# Patient Record
Sex: FEMALE | URBAN - METROPOLITAN AREA
[De-identification: names, ages, dates, MRNs, and addresses within clinical notes are randomized per-mention and may not be internally consistent; named-entity substitution may affect disease eponyms.]

---

## 2018-02-15 ENCOUNTER — APPOINTMENT (RX ONLY)
Dept: URBAN - METROPOLITAN AREA CLINIC 53 | Facility: CLINIC | Age: 43
Setting detail: DERMATOLOGY
End: 2018-02-15

## 2018-02-15 DIAGNOSIS — L57.0 ACTINIC KERATOSIS: ICD-10-CM

## 2018-02-15 DIAGNOSIS — L82.1 OTHER SEBORRHEIC KERATOSIS: ICD-10-CM

## 2018-02-15 DIAGNOSIS — Z41.9 ENCOUNTER FOR PROCEDURE FOR PURPOSES OTHER THAN REMEDYING HEALTH STATE, UNSPECIFIED: ICD-10-CM

## 2018-02-15 PROCEDURE — 17003 DESTRUCT PREMALG LES 2-14: CPT

## 2018-02-15 PROCEDURE — 17000 DESTRUCT PREMALG LESION: CPT

## 2018-02-15 PROCEDURE — ? BOTOX (U OR CC)

## 2018-02-15 PROCEDURE — ? COUNSELING

## 2018-02-15 PROCEDURE — ? LIQUID NITROGEN

## 2018-02-15 PROCEDURE — 99212 OFFICE O/P EST SF 10 MIN: CPT | Mod: 25

## 2018-02-15 ASSESSMENT — LOCATION DETAILED DESCRIPTION DERM
LOCATION DETAILED: LEFT MEDIAL FRONTAL SCALP
LOCATION DETAILED: LEFT SUPERIOR MEDIAL FOREHEAD
LOCATION DETAILED: RIGHT SUPERIOR LATERAL FOREHEAD
LOCATION DETAILED: SUPERIOR MID FOREHEAD
LOCATION DETAILED: RIGHT SUPERIOR FOREHEAD
LOCATION DETAILED: LEFT SUPERIOR FOREHEAD
LOCATION DETAILED: RIGHT CENTRAL FRONTAL SCALP

## 2018-02-15 ASSESSMENT — LOCATION SIMPLE DESCRIPTION DERM
LOCATION SIMPLE: LEFT FOREHEAD
LOCATION SIMPLE: SUPERIOR FOREHEAD
LOCATION SIMPLE: RIGHT FOREHEAD
LOCATION SIMPLE: RIGHT SCALP
LOCATION SIMPLE: LEFT SCALP

## 2018-02-15 ASSESSMENT — LOCATION ZONE DERM
LOCATION ZONE: FACE
LOCATION ZONE: SCALP

## 2018-02-15 NOTE — PROCEDURE: BOTOX (U OR CC)
Consent: Written consent obtained. Risks include but not limited to lid/brow ptosis, bruising, swelling, diplopia, temporary effect, incomplete chemical denervation.
Detail Level: Detailed
Price Per Unit Or Per Cc In $ (Use Numbers Only, No Special Characters Or $): 12.00
Post-Care Instructions: Instructions after Botox:\\nDo not lay flat for 4 hours after procedure. Limit physical activity for 24 hours. No heavy lifting (15lbs or greater) or bending (putting head below level of heart) for the remainder of the day. Do not apply pressure or compression to areas treated for 24 hours. Avoid facials or saunas for 24 hours after your treatment. Do not apply heat for 24 hours. Avoid traveling by airplane for 48 hours. Try to avoid make-up and skincare products for the remainder of the day to minimize risk for infection. If you need to apply make-up within 4 hours after your treatment, only use a GENTLE touch to avoid rubbing the treated area.\\n\\nFeel free to shower and go about most other regular daily activities. Be assured that any tiny bumps or marks will go away within a few hours.  Results may take up to 14 days to take full effect. Please wait until the 14 days have passed before assessing if you are pleased with the result. \\n\\nBotox is a temporary procedure. You may find that your treatment results will last approximately 3-4 months. If you maintain your treatment appointments with routine frequency, the duration of each treatment may last longer than 4 months. With routine follow-up treatments, we are able to create the best clinical results for you.
Dilution (U/0.1 Cc): 1.1
Measure In Units Or Cc's?: units
Quantity Per Injection Site (Units): 2

## 2018-02-15 NOTE — HPI: COSMETIC (BOTOX)
Have You Had Botox Before?: has had botox
When Was Your Last Botox Treatment?: 10/13/2017
Additional History: Patient was very happy with last treatment and the results.

## 2018-02-15 NOTE — PROCEDURE: LIQUID NITROGEN
Duration Of Freeze Thaw-Cycle (Seconds): 3
Post-Care Instructions: I reviewed with the patient in detail post-care instructions. Patient is to wear sunprotection, and avoid picking at any of the treated lesions. Pt may apply Vaseline to crusted or scabbing areas.
Render Post-Care Instructions In Note?: yes
Consent: The patient's consent was obtained including but not limited to risks of crusting, scabbing, blistering, scarring, darker or lighter pigmentary change, recurrence, incomplete removal and infection.
Number Of Freeze-Thaw Cycles: 1 freeze-thaw cycle
Detail Level: Detailed

## 2018-02-15 NOTE — HPI: SKIN LESION
How Severe Is Your Skin Lesion?: mild
Has Your Skin Lesion Been Treated?: not been treated
Is This A New Presentation, Or A Follow-Up?: Skin Lesion
Is This A New Presentation, Or A Follow-Up?: Mole

## 2018-05-04 ENCOUNTER — APPOINTMENT (RX ONLY)
Dept: URBAN - METROPOLITAN AREA CLINIC 50 | Facility: CLINIC | Age: 43
Setting detail: DERMATOLOGY
End: 2018-05-04

## 2018-05-04 DIAGNOSIS — D18.0 HEMANGIOMA: ICD-10-CM

## 2018-05-04 DIAGNOSIS — L82.1 OTHER SEBORRHEIC KERATOSIS: ICD-10-CM

## 2018-05-04 DIAGNOSIS — L81.4 OTHER MELANIN HYPERPIGMENTATION: ICD-10-CM

## 2018-05-04 DIAGNOSIS — L57.0 ACTINIC KERATOSIS: ICD-10-CM

## 2018-05-04 DIAGNOSIS — L82.0 INFLAMED SEBORRHEIC KERATOSIS: ICD-10-CM

## 2018-05-04 DIAGNOSIS — D22 MELANOCYTIC NEVI: ICD-10-CM

## 2018-05-04 PROBLEM — D48.5 NEOPLASM OF UNCERTAIN BEHAVIOR OF SKIN: Status: ACTIVE | Noted: 2018-05-04

## 2018-05-04 PROBLEM — D22.5 MELANOCYTIC NEVI OF TRUNK: Status: ACTIVE | Noted: 2018-05-04

## 2018-05-04 PROBLEM — D18.01 HEMANGIOMA OF SKIN AND SUBCUTANEOUS TISSUE: Status: ACTIVE | Noted: 2018-05-04

## 2018-05-04 PROCEDURE — 11100: CPT | Mod: 59

## 2018-05-04 PROCEDURE — 17000 DESTRUCT PREMALG LESION: CPT

## 2018-05-04 PROCEDURE — ? COUNSELING

## 2018-05-04 PROCEDURE — 99213 OFFICE O/P EST LOW 20 MIN: CPT | Mod: 25

## 2018-05-04 PROCEDURE — ? LIQUID NITROGEN

## 2018-05-04 PROCEDURE — ? BIOPSY BY SHAVE METHOD

## 2018-05-04 PROCEDURE — 17003 DESTRUCT PREMALG LES 2-14: CPT

## 2018-05-04 ASSESSMENT — LOCATION DETAILED DESCRIPTION DERM
LOCATION DETAILED: RIGHT ANTERIOR DISTAL THIGH
LOCATION DETAILED: RIGHT SUPERIOR UPPER BACK
LOCATION DETAILED: RIGHT INFERIOR CENTRAL MALAR CHEEK
LOCATION DETAILED: LEFT LATERAL ABDOMEN
LOCATION DETAILED: RIGHT INFERIOR LATERAL FOREHEAD
LOCATION DETAILED: LEFT POSTERIOR SHOULDER
LOCATION DETAILED: LEFT RIB CAGE
LOCATION DETAILED: LEFT LATERAL SUPERIOR CHEST
LOCATION DETAILED: RIGHT PROXIMAL RADIAL DORSAL FOREARM

## 2018-05-04 ASSESSMENT — LOCATION SIMPLE DESCRIPTION DERM
LOCATION SIMPLE: RIGHT FOREARM
LOCATION SIMPLE: RIGHT CHEEK
LOCATION SIMPLE: CHEST
LOCATION SIMPLE: RIGHT UPPER BACK
LOCATION SIMPLE: RIGHT THIGH
LOCATION SIMPLE: RIGHT FOREHEAD
LOCATION SIMPLE: LEFT SHOULDER
LOCATION SIMPLE: ABDOMEN

## 2018-05-04 ASSESSMENT — LOCATION ZONE DERM
LOCATION ZONE: LEG
LOCATION ZONE: ARM
LOCATION ZONE: FACE
LOCATION ZONE: TRUNK

## 2018-05-04 NOTE — PROCEDURE: LIQUID NITROGEN
Consent: The patient's consent was obtained including but not limited to risks of crusting, scabbing, blistering, scarring, darker or lighter pigmentary change, recurrence, incomplete removal and infection.
Post-Care Instructions: I reviewed with the patient in detail post-care instructions. Patient is to wear sunprotection, and avoid picking at any of the treated lesions. Pt may apply Vaseline to crusted or scabbing areas.
Detail Level: Detailed
Duration Of Freeze Thaw-Cycle (Seconds): 3
Number Of Freeze-Thaw Cycles: 1 freeze-thaw cycle
Render Post-Care Instructions In Note?: yes

## 2018-10-05 ENCOUNTER — APPOINTMENT (RX ONLY)
Dept: URBAN - METROPOLITAN AREA CLINIC 74 | Facility: CLINIC | Age: 43
Setting detail: DERMATOLOGY
End: 2018-10-05

## 2018-10-05 DIAGNOSIS — L57.0 ACTINIC KERATOSIS: ICD-10-CM

## 2018-10-05 PROCEDURE — ? COUNSELING

## 2018-10-05 PROCEDURE — ? LIQUID NITROGEN

## 2018-10-05 PROCEDURE — 17000 DESTRUCT PREMALG LESION: CPT

## 2018-10-05 PROCEDURE — 17003 DESTRUCT PREMALG LES 2-14: CPT

## 2018-10-05 ASSESSMENT — LOCATION SIMPLE DESCRIPTION DERM
LOCATION SIMPLE: CHEST
LOCATION SIMPLE: LEFT PRETIBIAL REGION
LOCATION SIMPLE: LEFT POSTERIOR UPPER ARM

## 2018-10-05 ASSESSMENT — LOCATION DETAILED DESCRIPTION DERM
LOCATION DETAILED: LEFT PROXIMAL POSTERIOR UPPER ARM
LOCATION DETAILED: LEFT LATERAL SUPERIOR CHEST
LOCATION DETAILED: LEFT MEDIAL PROXIMAL PRETIBIAL REGION
LOCATION DETAILED: RIGHT MEDIAL SUPERIOR CHEST

## 2018-10-05 ASSESSMENT — LOCATION ZONE DERM
LOCATION ZONE: ARM
LOCATION ZONE: LEG
LOCATION ZONE: TRUNK

## 2018-10-05 NOTE — PROCEDURE: LIQUID NITROGEN
Number Of Freeze-Thaw Cycles: 1 freeze-thaw cycle
Detail Level: Detailed
Render Post-Care Instructions In Note?: no
Duration Of Freeze Thaw-Cycle (Seconds): 20
Post-Care Instructions: I reviewed with the patient in detail post-care instructions. Patient is to wear sunprotection, and avoid picking at any of the treated lesions. Pt may apply Vaseline or Aquaphor to crusted or scabbing areas.
Consent: The patient's consent was obtained including but not limited to risks of crusting, scabbing, blistering, scarring, darker or lighter pigmentary change, recurrence, incomplete removal and infection.

## 2018-10-25 ENCOUNTER — APPOINTMENT (RX ONLY)
Dept: URBAN - METROPOLITAN AREA CLINIC 53 | Facility: CLINIC | Age: 43
Setting detail: DERMATOLOGY
End: 2018-10-25

## 2018-10-25 DIAGNOSIS — Z41.9 ENCOUNTER FOR PROCEDURE FOR PURPOSES OTHER THAN REMEDYING HEALTH STATE, UNSPECIFIED: ICD-10-CM

## 2018-10-25 PROCEDURE — ? BOTOX (U OR CC)

## 2018-10-25 ASSESSMENT — LOCATION SIMPLE DESCRIPTION DERM
LOCATION SIMPLE: SUPERIOR FOREHEAD
LOCATION SIMPLE: RIGHT FOREHEAD
LOCATION SIMPLE: LEFT FOREHEAD

## 2018-10-25 ASSESSMENT — LOCATION DETAILED DESCRIPTION DERM
LOCATION DETAILED: RIGHT SUPERIOR FOREHEAD
LOCATION DETAILED: RIGHT SUPERIOR LATERAL FOREHEAD
LOCATION DETAILED: LEFT SUPERIOR MEDIAL FOREHEAD
LOCATION DETAILED: SUPERIOR MID FOREHEAD
LOCATION DETAILED: LEFT SUPERIOR FOREHEAD

## 2018-10-25 ASSESSMENT — LOCATION ZONE DERM: LOCATION ZONE: FACE

## 2018-10-25 NOTE — PROCEDURE: BOTOX (U OR CC)
Price Per Unit Or Per Cc In $ (Use Numbers Only, No Special Characters Or $): 12.00
Detail Level: Detailed
Consent: Written consent obtained. Risks include but not limited to lid/brow ptosis, bruising, swelling, diplopia, temporary effect, incomplete chemical denervation.
Dilution (U/0.1 Cc): 1.1
Post-Care Instructions: Instructions after Botox:\\nDo not lay flat for 4 hours after procedure. Limit physical activity for 24 hours. No heavy lifting (15lbs or greater) or bending (putting head below level of heart) for the remainder of the day. Do not apply pressure or compression to areas treated for 24 hours. Avoid facials or saunas for 24 hours after your treatment. Do not apply heat for 24 hours. Avoid traveling by airplane for 48 hours. Try to avoid make-up and skincare products for the remainder of the day to minimize risk for infection. If you need to apply make-up within 4 hours after your treatment, only use a GENTLE touch to avoid rubbing the treated area.\\n\\nFeel free to shower and go about most other regular daily activities. Be assured that any tiny bumps or marks will go away within a few hours.  Results may take up to 14 days to take full effect. Please wait until the 14 days have passed before assessing if you are pleased with the result. \\n\\nBotox is a temporary procedure. You may find that your treatment results will last approximately 3-4 months. If you maintain your treatment appointments with routine frequency, the duration of each treatment may last longer than 4 months. With routine follow-up treatments, we are able to create the best clinical results for you.
Measure In Units Or Cc's?: units
Quantity Per Injection Site (Units): 2

## 2018-11-27 ENCOUNTER — APPOINTMENT (RX ONLY)
Dept: URBAN - METROPOLITAN AREA CLINIC 74 | Facility: CLINIC | Age: 43
Setting detail: DERMATOLOGY
End: 2018-11-27

## 2018-11-27 DIAGNOSIS — L81.4 OTHER MELANIN HYPERPIGMENTATION: ICD-10-CM

## 2018-11-27 DIAGNOSIS — Z41.9 ENCOUNTER FOR PROCEDURE FOR PURPOSES OTHER THAN REMEDYING HEALTH STATE, UNSPECIFIED: ICD-10-CM

## 2018-11-27 DIAGNOSIS — L57.0 ACTINIC KERATOSIS: ICD-10-CM

## 2018-11-27 PROCEDURE — ? LIQUID NITROGEN

## 2018-11-27 PROCEDURE — ? BOTOX (U OR CC)

## 2018-11-27 PROCEDURE — ? COUNSELING

## 2018-11-27 PROCEDURE — 17000 DESTRUCT PREMALG LESION: CPT

## 2018-11-27 PROCEDURE — 99212 OFFICE O/P EST SF 10 MIN: CPT | Mod: 25

## 2018-11-27 ASSESSMENT — LOCATION SIMPLE DESCRIPTION DERM
LOCATION SIMPLE: LEFT FOREARM
LOCATION SIMPLE: RIGHT FOREARM
LOCATION SIMPLE: LEFT FOREHEAD
LOCATION SIMPLE: LEFT SHOULDER
LOCATION SIMPLE: RIGHT FOREHEAD
LOCATION SIMPLE: GLABELLA

## 2018-11-27 ASSESSMENT — LOCATION DETAILED DESCRIPTION DERM
LOCATION DETAILED: RIGHT PROXIMAL DORSAL FOREARM
LOCATION DETAILED: LEFT POSTERIOR SHOULDER
LOCATION DETAILED: RIGHT DISTAL DORSAL FOREARM
LOCATION DETAILED: LEFT INFERIOR MEDIAL FOREHEAD
LOCATION DETAILED: RIGHT INFERIOR MEDIAL FOREHEAD
LOCATION DETAILED: GLABELLA
LOCATION DETAILED: RIGHT INFERIOR FOREHEAD
LOCATION DETAILED: LEFT INFERIOR FOREHEAD
LOCATION DETAILED: LEFT PROXIMAL DORSAL FOREARM

## 2018-11-27 ASSESSMENT — LOCATION ZONE DERM
LOCATION ZONE: FACE
LOCATION ZONE: ARM

## 2018-11-27 NOTE — PROCEDURE: LIQUID NITROGEN
Render Post-Care Instructions In Note?: no
Consent: The patient's consent was obtained including but not limited to risks of crusting, scabbing, blistering, scarring, darker or lighter pigmentary change, recurrence, incomplete removal and infection.
Post-Care Instructions: I reviewed with the patient in detail post-care instructions. Patient is to wear sunprotection, and avoid picking at any of the treated lesions. Pt may apply Vaseline or Aquaphor to crusted or scabbing areas.
Number Of Freeze-Thaw Cycles: 1 freeze-thaw cycle
Duration Of Freeze Thaw-Cycle (Seconds): 20
Detail Level: Detailed

## 2019-02-05 ENCOUNTER — APPOINTMENT (RX ONLY)
Dept: URBAN - METROPOLITAN AREA CLINIC 74 | Facility: CLINIC | Age: 44
Setting detail: DERMATOLOGY
End: 2019-02-05

## 2019-02-05 DIAGNOSIS — I78.8 OTHER DISEASES OF CAPILLARIES: ICD-10-CM

## 2019-02-05 DIAGNOSIS — L57.0 ACTINIC KERATOSIS: ICD-10-CM

## 2019-02-05 DIAGNOSIS — L81.4 OTHER MELANIN HYPERPIGMENTATION: ICD-10-CM

## 2019-02-05 PROCEDURE — ? PRODUCT LINE (OBAGI)

## 2019-02-05 PROCEDURE — ? LIQUID NITROGEN

## 2019-02-05 PROCEDURE — ? COUNSELING

## 2019-02-05 PROCEDURE — 17000 DESTRUCT PREMALG LESION: CPT

## 2019-02-05 PROCEDURE — 99213 OFFICE O/P EST LOW 20 MIN: CPT | Mod: 25

## 2019-02-05 ASSESSMENT — LOCATION DETAILED DESCRIPTION DERM
LOCATION DETAILED: LEFT INFERIOR CENTRAL MALAR CHEEK
LOCATION DETAILED: LEFT CENTRAL MALAR CHEEK
LOCATION DETAILED: SUPERIOR MID FOREHEAD

## 2019-02-05 ASSESSMENT — LOCATION SIMPLE DESCRIPTION DERM
LOCATION SIMPLE: SUPERIOR FOREHEAD
LOCATION SIMPLE: LEFT CHEEK

## 2019-02-05 ASSESSMENT — LOCATION ZONE DERM: LOCATION ZONE: FACE

## 2019-02-05 NOTE — PROCEDURE: PRODUCT LINE (OBAGI)
Product 4 Application Directions: Apply every other night, increase as tolerated
Product 21 Units: 0
Product 7 Application Directions: Apply daily
Product 10 Application Directions: Apply daily around eyes
Product 1 Application Directions: Apply every other night, increase to nightly as tolerated
Product 32 Price (In Dollars - Numeric Only, No Special Characters Or $): 0.00
Name Of Product 8: Obagi nu derm trial kit normal to dry
Name Of Product 11: Obagi matte SPF 50
Name Of Product 5: Obagi Vitamin C kit
Name Of Product 2: Obagi clarifying serum
Product 2 Price (In Dollars - Numeric Only, No Special Characters Or $): 123
Product 5 Price (In Dollars - Numeric Only, No Special Characters Or $): 299.20
Product 11 Price (In Dollars - Numeric Only, No Special Characters Or $): 59.23
Product 8 Price (In Dollars - Numeric Only, No Special Characters Or $): 183.44
Product 2 Units: 1
Product 2 Application Directions: Apply every am for pigmentation.
Product 5 Application Directions: Follow steps as instructed
Product 8 Application Directions: Apply morning and night as directed
Name Of Product 9: Obagi - C cleansing gel
Name Of Product 6: Obagi warm tinted sunscreen 50 SPF
Name Of Product 3: Obagi 
Detail Level: Zone
Product 9 Price (In Dollars - Numeric Only, No Special Characters Or $): 48.30
Product 3 Price (In Dollars - Numeric Only, No Special Characters Or $): 103
Allow Plan To Count Towards E/M Coding: Yes
Product 3 Application Directions: Apply nightly blended with Tretinoin.
Send Charges To Patient Encounter: No
Name Of Product 4: Tretinoin .05% cream
Name Of Product 10: Elastiderm eye serum
Name Of Product 7: Obagi elastiderm eye cream
Name Of Product 1: Obagi Tretinoin .1%
Product 7 Price (In Dollars - Numeric Only, No Special Characters Or $): 128
Product 10 Price (In Dollars - Numeric Only, No Special Characters Or $): 128.80
Product 1 Price (In Dollars - Numeric Only, No Special Characters Or $): 45

## 2019-02-05 NOTE — PROCEDURE: LIQUID NITROGEN
Detail Level: Detailed
Duration Of Freeze Thaw-Cycle (Seconds): 20
Render Post-Care Instructions In Note?: no
Number Of Freeze-Thaw Cycles: 2 freeze-thaw cycles
Post-Care Instructions: I reviewed with the patient in detail post-care instructions. Patient is to wear sunprotection, and avoid picking at any of the treated lesions. Pt may apply Vaseline or Aquaphor to crusted or scabbing areas.
Consent: The patient's consent was obtained including but not limited to risks of crusting, scabbing, blistering, scarring, darker or lighter pigmentary change, recurrence, incomplete removal and infection.

## 2019-04-04 ENCOUNTER — APPOINTMENT (RX ONLY)
Dept: URBAN - METROPOLITAN AREA CLINIC 74 | Facility: CLINIC | Age: 44
Setting detail: DERMATOLOGY
End: 2019-04-04

## 2019-04-04 DIAGNOSIS — Z41.9 ENCOUNTER FOR PROCEDURE FOR PURPOSES OTHER THAN REMEDYING HEALTH STATE, UNSPECIFIED: ICD-10-CM

## 2019-04-04 PROCEDURE — ? DIAGNOSIS COMMENT

## 2019-04-04 PROCEDURE — ? BOTOX (U OR CC)

## 2019-04-04 ASSESSMENT — LOCATION SIMPLE DESCRIPTION DERM
LOCATION SIMPLE: RIGHT FOREHEAD
LOCATION SIMPLE: SUPERIOR FOREHEAD
LOCATION SIMPLE: RIGHT SCALP
LOCATION SIMPLE: LEFT FOREHEAD

## 2019-04-04 ASSESSMENT — LOCATION DETAILED DESCRIPTION DERM
LOCATION DETAILED: RIGHT CENTRAL FRONTAL SCALP
LOCATION DETAILED: RIGHT FOREHEAD
LOCATION DETAILED: LEFT SUPERIOR FOREHEAD
LOCATION DETAILED: LEFT LATERAL FOREHEAD
LOCATION DETAILED: RIGHT LATERAL FOREHEAD
LOCATION DETAILED: LEFT FOREHEAD
LOCATION DETAILED: SUPERIOR MID FOREHEAD

## 2019-04-04 ASSESSMENT — LOCATION ZONE DERM
LOCATION ZONE: SCALP
LOCATION ZONE: FACE

## 2019-04-04 NOTE — PROCEDURE: BOTOX (U OR CC)
Encounter addended by: Rachel Richey, PT on: 5/22/2018  9:29 AM<BR>     Actions taken: Flowsheet accepted Price Per Unit Or Per Cc In $ (Use Numbers Only, No Special Characters Or $): 12

## 2019-08-30 ENCOUNTER — APPOINTMENT (RX ONLY)
Dept: URBAN - METROPOLITAN AREA CLINIC 74 | Facility: CLINIC | Age: 44
Setting detail: DERMATOLOGY
End: 2019-08-30

## 2019-08-30 DIAGNOSIS — Z41.9 ENCOUNTER FOR PROCEDURE FOR PURPOSES OTHER THAN REMEDYING HEALTH STATE, UNSPECIFIED: ICD-10-CM

## 2019-08-30 PROCEDURE — ? DIAGNOSIS COMMENT

## 2019-08-30 PROCEDURE — ? OTHER

## 2019-08-30 PROCEDURE — ? BOTOX (U OR CC)

## 2019-08-30 ASSESSMENT — LOCATION DETAILED DESCRIPTION DERM
LOCATION DETAILED: RIGHT FOREHEAD
LOCATION DETAILED: LEFT FOREHEAD
LOCATION DETAILED: RIGHT CENTRAL FRONTAL SCALP
LOCATION DETAILED: SUPERIOR MID FOREHEAD
LOCATION DETAILED: LEFT LATERAL FOREHEAD
LOCATION DETAILED: RIGHT LATERAL FOREHEAD
LOCATION DETAILED: LEFT SUPERIOR FOREHEAD

## 2019-08-30 ASSESSMENT — LOCATION SIMPLE DESCRIPTION DERM
LOCATION SIMPLE: RIGHT FOREHEAD
LOCATION SIMPLE: RIGHT SCALP
LOCATION SIMPLE: SUPERIOR FOREHEAD
LOCATION SIMPLE: LEFT FOREHEAD

## 2019-08-30 ASSESSMENT — LOCATION ZONE DERM
LOCATION ZONE: SCALP
LOCATION ZONE: FACE

## 2019-08-30 NOTE — PROCEDURE: OTHER
Detail Level: Zone
Other (Free Text): Patient purchased R essentials hydrating serum
Note Text (......Xxx Chief Complaint.): This diagnosis correlates with the

## 2020-01-14 ENCOUNTER — APPOINTMENT (RX ONLY)
Dept: URBAN - METROPOLITAN AREA CLINIC 74 | Facility: CLINIC | Age: 45
Setting detail: DERMATOLOGY
End: 2020-01-14

## 2020-01-14 DIAGNOSIS — L57.0 ACTINIC KERATOSIS: ICD-10-CM

## 2020-01-14 DIAGNOSIS — Z41.9 ENCOUNTER FOR PROCEDURE FOR PURPOSES OTHER THAN REMEDYING HEALTH STATE, UNSPECIFIED: ICD-10-CM

## 2020-01-14 PROCEDURE — 17000 DESTRUCT PREMALG LESION: CPT

## 2020-01-14 PROCEDURE — ? DIAGNOSIS COMMENT

## 2020-01-14 PROCEDURE — ? BOTOX (U OR CC)

## 2020-01-14 PROCEDURE — ? LIQUID NITROGEN

## 2020-01-14 PROCEDURE — ? FULL BODY SKIN EXAM - DECLINED

## 2020-01-14 ASSESSMENT — LOCATION DETAILED DESCRIPTION DERM
LOCATION DETAILED: LEFT SUPERIOR CENTRAL BUCCAL CHEEK
LOCATION DETAILED: RIGHT INFERIOR MEDIAL FOREHEAD
LOCATION DETAILED: RIGHT LATERAL FOREHEAD
LOCATION DETAILED: SUPERIOR MID FOREHEAD
LOCATION DETAILED: GLABELLA
LOCATION DETAILED: LEFT LATERAL FOREHEAD
LOCATION DETAILED: RIGHT FOREHEAD
LOCATION DETAILED: LEFT INFERIOR MEDIAL FOREHEAD
LOCATION DETAILED: LEFT FOREHEAD

## 2020-01-14 ASSESSMENT — LOCATION SIMPLE DESCRIPTION DERM
LOCATION SIMPLE: LEFT FOREHEAD
LOCATION SIMPLE: SUPERIOR FOREHEAD
LOCATION SIMPLE: LEFT CHEEK
LOCATION SIMPLE: RIGHT FOREHEAD
LOCATION SIMPLE: GLABELLA

## 2020-01-14 ASSESSMENT — LOCATION ZONE DERM: LOCATION ZONE: FACE

## 2020-01-14 NOTE — PROCEDURE: LIQUID NITROGEN
Number Of Freeze-Thaw Cycles: 2 freeze-thaw cycles
Post-Care Instructions: I reviewed with the patient in detail post-care instructions. Patient is to wear sunprotection, and avoid picking at any of the treated lesions. Pt may apply Vaseline or Aquaphor to crusted or scabbing areas.
Duration Of Freeze Thaw-Cycle (Seconds): 10
Render Note In Bullet Format When Appropriate: No
Consent: The patient's consent was obtained including but not limited to risks of crusting, scabbing, blistering, scarring, darker or lighter pigmentary change, recurrence, incomplete removal and infection.
Detail Level: Detailed

## 2020-01-23 ENCOUNTER — APPOINTMENT (RX ONLY)
Dept: URBAN - METROPOLITAN AREA CLINIC 74 | Facility: CLINIC | Age: 45
Setting detail: DERMATOLOGY
End: 2020-01-23

## 2020-01-23 DIAGNOSIS — Z41.9 ENCOUNTER FOR PROCEDURE FOR PURPOSES OTHER THAN REMEDYING HEALTH STATE, UNSPECIFIED: ICD-10-CM

## 2020-01-23 PROCEDURE — ? BOTOX (U OR CC)

## 2020-01-23 ASSESSMENT — LOCATION DETAILED DESCRIPTION DERM
LOCATION DETAILED: LEFT CENTRAL FRONTAL SCALP
LOCATION DETAILED: LEFT MEDIAL FRONTAL SCALP
LOCATION DETAILED: RIGHT CENTRAL FRONTAL SCALP

## 2020-01-23 ASSESSMENT — LOCATION SIMPLE DESCRIPTION DERM
LOCATION SIMPLE: LEFT SCALP
LOCATION SIMPLE: RIGHT SCALP

## 2020-01-23 ASSESSMENT — LOCATION ZONE DERM: LOCATION ZONE: SCALP

## 2020-05-08 ENCOUNTER — APPOINTMENT (RX ONLY)
Dept: URBAN - METROPOLITAN AREA CLINIC 74 | Facility: CLINIC | Age: 45
Setting detail: DERMATOLOGY
End: 2020-05-08

## 2020-05-08 DIAGNOSIS — L27.1 LOCALIZED SKIN ERUPTION DUE TO DRUGS AND MEDICAMENTS TAKEN INTERNALLY: ICD-10-CM

## 2020-05-08 PROCEDURE — 99213 OFFICE O/P EST LOW 20 MIN: CPT

## 2020-05-08 PROCEDURE — ? COUNSELING

## 2020-05-08 PROCEDURE — ? ADDITIONAL NOTES

## 2020-05-08 PROCEDURE — ? TREATMENT REGIMEN

## 2020-05-08 ASSESSMENT — LOCATION SIMPLE DESCRIPTION DERM: LOCATION SIMPLE: RIGHT CHEEK

## 2020-05-08 ASSESSMENT — LOCATION ZONE DERM: LOCATION ZONE: FACE

## 2020-05-08 ASSESSMENT — LOCATION DETAILED DESCRIPTION DERM
LOCATION DETAILED: RIGHT INFERIOR CENTRAL MALAR CHEEK
LOCATION DETAILED: RIGHT CENTRAL BUCCAL CHEEK

## 2020-05-08 NOTE — PROCEDURE: TREATMENT REGIMEN
Initiate Treatment: Triamcinolone.1% cream apply sparingly to affected areas of face twice daily, taper as improves \\nHypoallergenic cleanser and moisturizer
Detail Level: Zone
Discontinue Regimen: Retinoid until healed. Once restarted, start very slowly and gradually increase

## 2020-06-02 ENCOUNTER — APPOINTMENT (RX ONLY)
Dept: URBAN - METROPOLITAN AREA CLINIC 74 | Facility: CLINIC | Age: 45
Setting detail: DERMATOLOGY
End: 2020-06-02

## 2020-06-02 DIAGNOSIS — L01.01 NON-BULLOUS IMPETIGO: ICD-10-CM

## 2020-06-02 PROBLEM — L08.9 LOCAL INFECTION OF THE SKIN AND SUBCUTANEOUS TISSUE, UNSPECIFIED: Status: ACTIVE | Noted: 2020-06-02

## 2020-06-02 PROCEDURE — ? PRESCRIPTION

## 2020-06-02 PROCEDURE — ? FULL BODY SKIN EXAM - DECLINED

## 2020-06-02 PROCEDURE — ? COUNSELING

## 2020-06-02 PROCEDURE — 99213 OFFICE O/P EST LOW 20 MIN: CPT

## 2020-06-02 RX ORDER — MUPIROCIN 20 MG/G
OINTMENT TOPICAL TID
Qty: 1 | Refills: 2 | Status: ERX | COMMUNITY
Start: 2020-06-02

## 2020-06-02 RX ADMIN — MUPIROCIN: 20 OINTMENT TOPICAL at 00:00

## 2020-06-02 ASSESSMENT — LOCATION ZONE DERM: LOCATION ZONE: FACE

## 2020-06-02 ASSESSMENT — LOCATION SIMPLE DESCRIPTION DERM: LOCATION SIMPLE: RIGHT CHEEK

## 2020-06-02 ASSESSMENT — LOCATION DETAILED DESCRIPTION DERM: LOCATION DETAILED: RIGHT CENTRAL BUCCAL CHEEK

## 2020-11-05 ENCOUNTER — APPOINTMENT (RX ONLY)
Dept: URBAN - METROPOLITAN AREA CLINIC 74 | Facility: CLINIC | Age: 45
Setting detail: DERMATOLOGY
End: 2020-11-05

## 2020-11-05 DIAGNOSIS — D22 MELANOCYTIC NEVI: ICD-10-CM

## 2020-11-05 DIAGNOSIS — L82.0 INFLAMED SEBORRHEIC KERATOSIS: ICD-10-CM

## 2020-11-05 DIAGNOSIS — L82.1 OTHER SEBORRHEIC KERATOSIS: ICD-10-CM

## 2020-11-05 DIAGNOSIS — L81.4 OTHER MELANIN HYPERPIGMENTATION: ICD-10-CM

## 2020-11-05 DIAGNOSIS — L91.8 OTHER HYPERTROPHIC DISORDERS OF THE SKIN: ICD-10-CM

## 2020-11-05 DIAGNOSIS — L57.0 ACTINIC KERATOSIS: ICD-10-CM

## 2020-11-05 PROBLEM — D22.5 MELANOCYTIC NEVI OF TRUNK: Status: ACTIVE | Noted: 2020-11-05

## 2020-11-05 PROBLEM — D48.5 NEOPLASM OF UNCERTAIN BEHAVIOR OF SKIN: Status: ACTIVE | Noted: 2020-11-05

## 2020-11-05 PROCEDURE — 17000 DESTRUCT PREMALG LESION: CPT | Mod: 59

## 2020-11-05 PROCEDURE — ? COUNSELING

## 2020-11-05 PROCEDURE — ? FULL BODY SKIN EXAM

## 2020-11-05 PROCEDURE — ? LIQUID NITROGEN

## 2020-11-05 PROCEDURE — 17003 DESTRUCT PREMALG LES 2-14: CPT

## 2020-11-05 PROCEDURE — 11102 TANGNTL BX SKIN SINGLE LES: CPT

## 2020-11-05 PROCEDURE — ? BIOPSY BY SHAVE METHOD

## 2020-11-05 PROCEDURE — 99214 OFFICE O/P EST MOD 30 MIN: CPT | Mod: 25

## 2020-11-05 PROCEDURE — 11103 TANGNTL BX SKIN EA SEP/ADDL: CPT

## 2020-11-05 ASSESSMENT — LOCATION ZONE DERM
LOCATION ZONE: ARM
LOCATION ZONE: TRUNK
LOCATION ZONE: LEG

## 2020-11-05 ASSESSMENT — LOCATION DETAILED DESCRIPTION DERM
LOCATION DETAILED: LEFT PROXIMAL PRETIBIAL REGION
LOCATION DETAILED: LEFT ANTERIOR PROXIMAL THIGH
LOCATION DETAILED: MIDDLE STERNUM
LOCATION DETAILED: LEFT MEDIAL BREAST 7-8:00 REGION
LOCATION DETAILED: RIGHT ANTERIOR DISTAL THIGH
LOCATION DETAILED: LEFT DISTAL DORSAL FOREARM
LOCATION DETAILED: INFERIOR THORACIC SPINE
LOCATION DETAILED: RIGHT PROXIMAL DORSAL FOREARM
LOCATION DETAILED: RIGHT RIB CAGE
LOCATION DETAILED: LEFT SUPERIOR LATERAL UPPER BACK
LOCATION DETAILED: RIGHT ANTERIOR PROXIMAL THIGH
LOCATION DETAILED: RIGHT DISTAL PRETIBIAL REGION
LOCATION DETAILED: LEFT SUPERIOR UPPER BACK
LOCATION DETAILED: LEFT PROXIMAL DORSAL FOREARM
LOCATION DETAILED: LEFT MEDIAL UPPER BACK
LOCATION DETAILED: LEFT ANTERIOR DISTAL THIGH

## 2020-11-05 ASSESSMENT — LOCATION SIMPLE DESCRIPTION DERM
LOCATION SIMPLE: CHEST
LOCATION SIMPLE: ABDOMEN
LOCATION SIMPLE: RIGHT THIGH
LOCATION SIMPLE: UPPER BACK
LOCATION SIMPLE: LEFT PRETIBIAL REGION
LOCATION SIMPLE: LEFT BREAST
LOCATION SIMPLE: LEFT FOREARM
LOCATION SIMPLE: LEFT UPPER BACK
LOCATION SIMPLE: RIGHT FOREARM
LOCATION SIMPLE: RIGHT PRETIBIAL REGION
LOCATION SIMPLE: LEFT THIGH

## 2020-11-05 NOTE — PROCEDURE: LIQUID NITROGEN
Duration Of Freeze Thaw-Cycle (Seconds): 10
Detail Level: Detailed
Number Of Freeze-Thaw Cycles: 2 freeze-thaw cycles
Post-Care Instructions: I reviewed with the patient in detail post-care instructions. Patient is to wear sunprotection, and avoid picking at any of the treated lesions. Pt may apply Vaseline or Aquaphor to crusted or scabbing areas.
Render Note In Bullet Format When Appropriate: No
Consent: The patient's consent was obtained including but not limited to risks of crusting, scabbing, blistering, scarring, darker or lighter pigmentary change, recurrence, incomplete removal and infection.

## 2020-11-05 NOTE — HPI: EVALUATION OF SKIN LESION(S)
What Type Of Note Output Would You Prefer (Optional)?: Bullet Format
Hpi Title: Evaluation of Skin Lesions
How Severe Are Your Spot(S)?: moderate
Have Your Spot(S) Been Treated In The Past?: has not been treated
Additional History: Pt c/o lesion on left thigh x 3 weeks that is “rough and raised”. Also c/o “rough and raised” lesion on left post shoulder x months

## 2021-02-23 ENCOUNTER — APPOINTMENT (RX ONLY)
Dept: URBAN - METROPOLITAN AREA CLINIC 74 | Facility: CLINIC | Age: 46
Setting detail: DERMATOLOGY
End: 2021-02-23

## 2021-02-23 DIAGNOSIS — L57.0 ACTINIC KERATOSIS: ICD-10-CM

## 2021-02-23 DIAGNOSIS — L81.4 OTHER MELANIN HYPERPIGMENTATION: ICD-10-CM

## 2021-02-23 DIAGNOSIS — D485 NEOPLASM OF UNCERTAIN BEHAVIOR OF SKIN: ICD-10-CM

## 2021-02-23 PROBLEM — D48.5 NEOPLASM OF UNCERTAIN BEHAVIOR OF SKIN: Status: ACTIVE | Noted: 2021-02-23

## 2021-02-23 PROCEDURE — 11102 TANGNTL BX SKIN SINGLE LES: CPT

## 2021-02-23 PROCEDURE — 17003 DESTRUCT PREMALG LES 2-14: CPT

## 2021-02-23 PROCEDURE — ? BIOPSY BY SHAVE METHOD

## 2021-02-23 PROCEDURE — ? FULL BODY SKIN EXAM - DECLINED

## 2021-02-23 PROCEDURE — ? LIQUID NITROGEN

## 2021-02-23 PROCEDURE — ? TREATMENT REGIMEN

## 2021-02-23 PROCEDURE — 17000 DESTRUCT PREMALG LESION: CPT | Mod: 59

## 2021-02-23 PROCEDURE — 99212 OFFICE O/P EST SF 10 MIN: CPT | Mod: 25

## 2021-02-23 PROCEDURE — ? COUNSELING

## 2021-02-23 ASSESSMENT — LOCATION DETAILED DESCRIPTION DERM
LOCATION DETAILED: LEFT MEDIAL FOREHEAD
LOCATION DETAILED: RIGHT PROXIMAL PRETIBIAL REGION
LOCATION DETAILED: RIGHT CENTRAL MALAR CHEEK
LOCATION DETAILED: STERNAL NOTCH
LOCATION DETAILED: LEFT ANTERIOR SHOULDER
LOCATION DETAILED: LEFT CENTRAL MALAR CHEEK
LOCATION DETAILED: RIGHT LATERAL SUPERIOR CHEST

## 2021-02-23 ASSESSMENT — LOCATION SIMPLE DESCRIPTION DERM
LOCATION SIMPLE: CHEST
LOCATION SIMPLE: LEFT SHOULDER
LOCATION SIMPLE: RIGHT PRETIBIAL REGION
LOCATION SIMPLE: RIGHT CHEEK
LOCATION SIMPLE: LEFT CHEEK
LOCATION SIMPLE: LEFT FOREHEAD

## 2021-02-23 ASSESSMENT — LOCATION ZONE DERM
LOCATION ZONE: TRUNK
LOCATION ZONE: FACE
LOCATION ZONE: ARM
LOCATION ZONE: LEG

## 2021-02-23 NOTE — PROCEDURE: LIQUID NITROGEN
Render Note In Bullet Format When Appropriate: No
Post-Care Instructions: I reviewed with the patient in detail post-care instructions. Patient is to wear sunprotection, and avoid picking at any of the treated lesions. Pt may apply Vaseline or Aquaphor to crusted or scabbing areas.
Detail Level: Detailed
Consent: The patient's consent was obtained including but not limited to risks of crusting, scabbing, blistering, scarring, darker or lighter pigmentary change, recurrence, incomplete removal and infection.
Number Of Freeze-Thaw Cycles: 2 freeze-thaw cycles
Duration Of Freeze Thaw-Cycle (Seconds): 10

## 2021-04-21 ENCOUNTER — APPOINTMENT (RX ONLY)
Dept: URBAN - METROPOLITAN AREA CLINIC 74 | Facility: CLINIC | Age: 46
Setting detail: DERMATOLOGY
End: 2021-04-21

## 2021-04-21 DIAGNOSIS — L81.4 OTHER MELANIN HYPERPIGMENTATION: ICD-10-CM

## 2021-04-21 PROCEDURE — ? PRODUCT LINE (ZO)

## 2021-04-21 NOTE — PROCEDURE: PRODUCT LINE (ZO)
Name Of Product 15: Refissa
Product 20 Price (In Dollars - Numeric Only, No Special Characters Or $): 0.00
Product 17 Units: 0
Risk Of Complication Category: Minimal
Product 4 Application Directions: Apply every other day, increase to daily as tolerated
Product 9 Application Directions: Apply qd
Product 14 Application Directions: Apply as directed with gentle cleanser, exfoliating polish, oil control pads, daily power defense and rozatrol
Product 22 Application Directions: Apply as directed
Name Of Product 20: Aggressive anti-aging kit
Allow Plan To Count Towards E/M Coding: Yes
Product 12 Price (In Dollars - Numeric Only, No Special Characters Or $): 213
Name Of Product 7: ZO Power defense
Name Of Product 2: ZO Exfoliating Polish
Product 19 Application Directions: Apply serum daily
Name Of Product 12: Anti-aging Program Kit
Product 6 Application Directions: Apply, lather rinse daily
Name Of Product 17: Radical night repair
Product 9 Price (In Dollars - Numeric Only, No Special Characters Or $): 88.00
Product 1 Application Directions: Apply every night
Product 11 Application Directions: Apply as directed with Exfoliating cleanser, exfoliating polish, complexion renewal pads, daily power defense
Product 16 Application Directions: Apply daily post cleansing
Name Of Product 4: Complexion pads
Name Of Product 22: Skin normalizing kit
Name Of Product 9: ZO Rozatrol serum
Product 14 Price (In Dollars - Numeric Only, No Special Characters Or $): 220
Product 21 Application Directions: Apply to affected area morning and night
Product 3 Application Directions: Apply nightly
Render Product Pricing In Note: No
Name Of Product 19: Growth factor eye serum
Product 8 Application Directions: Apply daily to eyelids
Product 11 Price (In Dollars - Numeric Only, No Special Characters Or $): 124
Name Of Product 6: ZO gentle cleanser
Product 18 Application Directions: Apply as directed with gentle cleanser, exfoliating polish, complexion renewal pads, daily power defense, pigment control creme, pigment control blending creme
Product 13 Application Directions: Apply as directed with retinol skin brightened, bright alive skin brightener, wrinkle + texture repair and daily power defense
Name Of Product 11: ZO Daily Skincare Kit
Name Of Product 16: Complexion Renewal Pads
Name Of Product 1: ZO growth serum
Product 5 Application Directions: Apply with cotton round daily
Product 3 Price (In Dollars - Numeric Only, No Special Characters Or $): 66
Product 8 Price (In Dollars - Numeric Only, No Special Characters Or $): 140.00
Product 10 Application Directions: Follow kit directions with Exfoliating cleanser, exfoliating polish, complexion renewal pads, sulfur masque
Product 15 Application Directions: Apply nightly as tolerated
Product 23 Application Directions: Apply daily
Name Of Product 21: Pigment control creme
Product 1 Price (In Dollars - Numeric Only, No Special Characters Or $): 148
Name Of Product 8: ZO Eye brightening creme
Product 23 Units: 1
Name Of Product 3: ZO Pigment control cream + blending
Detail Level: Zone
Product 5 Price (In Dollars - Numeric Only, No Special Characters Or $): 37.00
Product 20 Application Directions: Apply hydrating cleanser, exfoliating polish, complexion renewal pads, daily power defense, radical night repair, renewal creme as directed
Name Of Product 13: Skin brightening program +texture repair
Name Of Product 18: Pigment Control program + Hydroquinone
Product 7 Application Directions: Apply morning and night
Product 10 Price (In Dollars - Numeric Only, No Special Characters Or $): 140
Product 15 Price (In Dollars - Numeric Only, No Special Characters Or $): 70
Product 2 Application Directions: Apply, lather rinse nightly
Name Of Product 5: ZO Calming Toner
Assigning Risk Information: Per AMA, level of risk is based upon consequences of the problem(s) addressed at the encounter when appropriately treated. Risk also includes medical decision making related to the need to initiate or forego further testing, treatment and/or hospitalization. Over the counter medication are assigned a risk level of low. Prescription medication management is assigned a risk level of moderate.
Name Of Product 23: ZO Pigment Control plus brightening creme
Product 12 Application Directions: Apply as directed Exfoliating cleanser, exfoliating polish, complexion renewal pads, daily power defense, growth factor serum
Name Of Product 10: Complexion treatment kit

## 2021-05-04 ENCOUNTER — APPOINTMENT (RX ONLY)
Dept: URBAN - METROPOLITAN AREA CLINIC 74 | Facility: CLINIC | Age: 46
Setting detail: DERMATOLOGY
End: 2021-05-04

## 2021-05-04 DIAGNOSIS — Z41.9 ENCOUNTER FOR PROCEDURE FOR PURPOSES OTHER THAN REMEDYING HEALTH STATE, UNSPECIFIED: ICD-10-CM

## 2021-05-04 PROCEDURE — ? BOTOX (U OR CC)

## 2021-05-04 ASSESSMENT — LOCATION DETAILED DESCRIPTION DERM
LOCATION DETAILED: LEFT INFERIOR FOREHEAD
LOCATION DETAILED: LEFT LATERAL FOREHEAD
LOCATION DETAILED: GLABELLA
LOCATION DETAILED: RIGHT FOREHEAD
LOCATION DETAILED: RIGHT LATERAL FOREHEAD
LOCATION DETAILED: RIGHT INFERIOR FOREHEAD
LOCATION DETAILED: LEFT INFERIOR MEDIAL FOREHEAD
LOCATION DETAILED: LEFT NASAL SIDEWALL
LOCATION DETAILED: LEFT FOREHEAD
LOCATION DETAILED: NASAL DORSUM
LOCATION DETAILED: RIGHT INFERIOR MEDIAL FOREHEAD
LOCATION DETAILED: SUPERIOR MID FOREHEAD

## 2021-05-04 ASSESSMENT — LOCATION SIMPLE DESCRIPTION DERM
LOCATION SIMPLE: LEFT NOSE
LOCATION SIMPLE: RIGHT FOREHEAD
LOCATION SIMPLE: SUPERIOR FOREHEAD
LOCATION SIMPLE: LEFT FOREHEAD
LOCATION SIMPLE: NOSE
LOCATION SIMPLE: GLABELLA

## 2021-05-04 ASSESSMENT — LOCATION ZONE DERM
LOCATION ZONE: NOSE
LOCATION ZONE: FACE

## 2021-09-13 ENCOUNTER — APPOINTMENT (RX ONLY)
Dept: URBAN - METROPOLITAN AREA CLINIC 74 | Facility: CLINIC | Age: 46
Setting detail: DERMATOLOGY
End: 2021-09-13

## 2021-09-13 DIAGNOSIS — L21.8 OTHER SEBORRHEIC DERMATITIS: ICD-10-CM | Status: INADEQUATELY CONTROLLED

## 2021-09-13 DIAGNOSIS — L81.4 OTHER MELANIN HYPERPIGMENTATION: ICD-10-CM

## 2021-09-13 PROCEDURE — ? FULL BODY SKIN EXAM - DECLINED

## 2021-09-13 PROCEDURE — ? PRESCRIPTION

## 2021-09-13 PROCEDURE — 99213 OFFICE O/P EST LOW 20 MIN: CPT

## 2021-09-13 PROCEDURE — ? TREATMENT REGIMEN

## 2021-09-13 PROCEDURE — ? COUNSELING

## 2021-09-13 PROCEDURE — ? PRODUCT LINE (ZO)

## 2021-09-13 RX ORDER — KETOCONAZOLE 20 MG/G
1 CREAM TOPICAL BID
Qty: 60 | Refills: 2 | Status: ERX | COMMUNITY
Start: 2021-09-13

## 2021-09-13 RX ORDER — TRIAMCINOLONE ACETONIDE 1 MG/G
1 CREAM TOPICAL BID
Qty: 80 | Refills: 1 | Status: ERX | COMMUNITY
Start: 2021-09-13

## 2021-09-13 RX ADMIN — KETOCONAZOLE 1: 20 CREAM TOPICAL at 00:00

## 2021-09-13 RX ADMIN — TRIAMCINOLONE ACETONIDE 1: 1 CREAM TOPICAL at 00:00

## 2021-09-13 ASSESSMENT — LOCATION ZONE DERM
LOCATION ZONE: FACE
LOCATION ZONE: NOSE

## 2021-09-13 ASSESSMENT — LOCATION DETAILED DESCRIPTION DERM
LOCATION DETAILED: RIGHT CENTRAL MALAR CHEEK
LOCATION DETAILED: LEFT CENTRAL MALAR CHEEK
LOCATION DETAILED: LEFT NASAL ALA

## 2021-09-13 ASSESSMENT — LOCATION SIMPLE DESCRIPTION DERM
LOCATION SIMPLE: RIGHT CHEEK
LOCATION SIMPLE: LEFT CHEEK
LOCATION SIMPLE: LEFT NOSE

## 2021-09-13 NOTE — PROCEDURE: PRODUCT LINE (ZO)
Product 25 Units: 0
Product 31 Price (In Dollars - Numeric Only, No Special Characters Or $): 67
Product 48 Price (In Dollars - Numeric Only, No Special Characters Or $): 0.00
Product 34 Price (In Dollars - Numeric Only, No Special Characters Or $): 70
Product 13 Application Directions: Apply as directed with retinol skin brightened, bright alive skin brightener, wrinkle + texture repair and daily power defense
Product 2 Application Directions: Apply, lather rinse nightly
Product 7 Application Directions: Apply morning and night
Name Of Product 5: ZO Calming Toner
Product 22 Application Directions: Apply as directed
Product 5 Price (In Dollars - Numeric Only, No Special Characters Or $): 37.00
Name Of Product 8: ZO Eye brightening creme
Product 18 Application Directions: Apply as directed with gentle cleanser, exfoliating polish, complexion renewal pads, daily power defense, pigment control creme, pigment control blending creme
Product 25 Application Directions: Apply every other night, increase as tolerated
Name Of Product 3: ZO Pigment control cream + blending
Product 34 Application Directions: Apply every night as tolerated
Product 28 Application Directions: Apply twice daily
Product 31 Application Directions: Apply, lather rinse daily
Name Of Product 26: ZO sunscreen plus primer
Product 10 Application Directions: Follow kit directions with Exfoliating cleanser, exfoliating polish, complexion renewal pads, sulfur masque
Name Of Product 14: Skin normalizing kit
Render Product Pricing In Note: No
Name Of Product 19: Growth factor eye serum
Product 14 Price (In Dollars - Numeric Only, No Special Characters Or $): 220
Name Of Product 20: Aggressive anti-aging kit
Product 3 Price (In Dollars - Numeric Only, No Special Characters Or $): 66
Name Of Product 23: ZO Pigment Control plus brightening creme
Product 8 Price (In Dollars - Numeric Only, No Special Characters Or $): 140.00
Name Of Product 1: ZO growth serum
Name Of Product 11: ZO Daily Skincare Kit
Product 5 Application Directions: Apply with cotton round daily
Product 20 Price (In Dollars - Numeric Only, No Special Characters Or $): 263
Name Of Product 32: Remastered bright alive
Name Of Product 35: Enzyme Peel
Name Of Product 29: Retinol skin brightener
Product 11 Price (In Dollars - Numeric Only, No Special Characters Or $): 124
Product 29 Price (In Dollars - Numeric Only, No Special Characters Or $): 104
Product 8 Application Directions: Apply daily to eyelids
Product 1 Price (In Dollars - Numeric Only, No Special Characters Or $): 148
Product 35 Price (In Dollars - Numeric Only, No Special Characters Or $): 72
Detail Level: Zone
Product 16 Application Directions: Apply daily post cleansing
Product 23 Application Directions: Apply daily
Product 14 Application Directions: Apply as directed with gentle cleanser, exfoliating polish, oil control pads, daily power defense and rozatrol
Product 19 Application Directions: Apply serum daily
Name Of Product 6: ZO gentle cleanser
Name Of Product 15: Refissa
Product 3 Application Directions: Apply nightly
Product 26 Application Directions: Apply daily, reapply as needed
Name Of Product 9: ZO Rozatrol serum
Product 20 Application Directions: Apply hydrating cleanser, exfoliating polish, complexion renewal pads, daily power defense, radical night repair, renewal creme as directed
Product 9 Price (In Dollars - Numeric Only, No Special Characters Or $): 88.00
Product 6 Price (In Dollars - Numeric Only, No Special Characters Or $): 45
Name Of Product 21: Pigment control creme
Product 29 Application Directions: Apply every night
Product 35 Application Directions: Apply weekly
Product 11 Application Directions: Apply as directed with Exfoliating cleanser, exfoliating polish, complexion renewal pads, daily power defense
Product 32 Application Directions: Apply twice daiky
Name Of Product 17: Radical night repair
Name Of Product 24: Exfoliating cleanser
Name Of Product 30: ZO pore refiner
Name Of Product 33: Oil control pads
Name Of Product 12: Anti-aging Program Kit
Name Of Product 4: Complexion pads
Assigning Risk Information: Per AMA, level of risk is based upon consequences of the problem(s) addressed at the encounter when appropriately treated. Risk also includes medical decision making related to the need to initiate or forego further testing, treatment and/or hospitalization. Over the counter medication are assigned a risk level of low. Prescription medication management is assigned a risk level of moderate.
Name Of Product 27: Dual action scrub
Product 12 Application Directions: Apply as directed Exfoliating cleanser, exfoliating polish, complexion renewal pads, daily power defense, growth factor serum
Risk Of Complication Category: Low (OTC Medications)
Name Of Product 2: ZO Exfoliating Polish
Product 9 Application Directions: Apply qd
Product 12 Price (In Dollars - Numeric Only, No Special Characters Or $): 213
Product 33 Price (In Dollars - Numeric Only, No Special Characters Or $): 62
Product 15 Application Directions: Apply nightly as tolerated
Product 21 Application Directions: Apply to affected area morning and night
Allow Plan To Count Towards E/M Coding: Yes
Name Of Product 7: ZO Power defense
Product 24 Application Directions: Apply lather rinse daily
Name Of Product 13: Skin brightening program +texture repair
Product 30 Application Directions: Apply daily to affected areas
Name Of Product 25: Wrinkle and texture repair cream
Product 33 Application Directions: Apply daily as tolerated
Product 7 Price (In Dollars - Numeric Only, No Special Characters Or $): 150
Name Of Product 10: Complexion treatment kit
Name Of Product 18: Pigment Control program + Hydroquinone
Product 27 Application Directions: Apply, lather rinse twice weekly
Name Of Product 28: Hydrating cream
Product 18 Price (In Dollars - Numeric Only, No Special Characters Or $): 182
Product 4 Application Directions: Apply every other day, increase to daily as tolerated
Name Of Product 31: Exfoliating polish
Product 10 Price (In Dollars - Numeric Only, No Special Characters Or $): 140
Name Of Product 16: Complexion Renewal Pads
Name Of Product 34: Tretinoin .05% cream
Product 7 Units: 1

## 2021-09-13 NOTE — PROCEDURE: TREATMENT REGIMEN
Initiate Treatment: Start TAC .1% twice daily until clearing, then taper to once daily, then taper off. \\nStart ketoconazol 2% cream twice daily for 14 days
Detail Level: Zone

## 2021-09-13 NOTE — HPI: SKIN LESION
What Type Of Note Output Would You Prefer (Optional)?: Bullet Format
How Severe Is Your Skin Lesion?: mild
Has Your Skin Lesion Been Treated?: not been treated
Is This A New Presentation, Or A Follow-Up?: Skin Lesion
Additional History: Treated with neosporin but did not help

## 2021-11-04 ENCOUNTER — APPOINTMENT (RX ONLY)
Dept: URBAN - METROPOLITAN AREA CLINIC 74 | Facility: CLINIC | Age: 46
Setting detail: DERMATOLOGY
End: 2021-11-04

## 2021-11-04 DIAGNOSIS — D485 NEOPLASM OF UNCERTAIN BEHAVIOR OF SKIN: ICD-10-CM

## 2021-11-04 DIAGNOSIS — Z41.9 ENCOUNTER FOR PROCEDURE FOR PURPOSES OTHER THAN REMEDYING HEALTH STATE, UNSPECIFIED: ICD-10-CM

## 2021-11-04 PROBLEM — D48.5 NEOPLASM OF UNCERTAIN BEHAVIOR OF SKIN: Status: ACTIVE | Noted: 2021-11-04

## 2021-11-04 PROCEDURE — ? FULL BODY SKIN EXAM - DECLINED

## 2021-11-04 PROCEDURE — ? BOTOX (U OR CC)

## 2021-11-04 PROCEDURE — 11102 TANGNTL BX SKIN SINGLE LES: CPT

## 2021-11-04 PROCEDURE — ? BIOPSY BY SHAVE METHOD

## 2021-11-04 ASSESSMENT — LOCATION DETAILED DESCRIPTION DERM
LOCATION DETAILED: RIGHT INFERIOR MEDIAL FOREHEAD
LOCATION DETAILED: SUPERIOR MID FOREHEAD
LOCATION DETAILED: LEFT INFERIOR MEDIAL FOREHEAD
LOCATION DETAILED: RIGHT LATERAL FOREHEAD
LOCATION DETAILED: LEFT INFERIOR FOREHEAD
LOCATION DETAILED: LEFT SUPERIOR FOREHEAD
LOCATION DETAILED: GLABELLA
LOCATION DETAILED: LEFT LATERAL FOREHEAD
LOCATION DETAILED: RIGHT INFERIOR FOREHEAD
LOCATION DETAILED: RIGHT ANTERIOR DISTAL THIGH
LOCATION DETAILED: LEFT NASAL SIDEWALL
LOCATION DETAILED: RIGHT SUPERIOR FOREHEAD
LOCATION DETAILED: LEFT FOREHEAD
LOCATION DETAILED: RIGHT FOREHEAD
LOCATION DETAILED: NASAL DORSUM

## 2021-11-04 ASSESSMENT — LOCATION SIMPLE DESCRIPTION DERM
LOCATION SIMPLE: NOSE
LOCATION SIMPLE: RIGHT THIGH
LOCATION SIMPLE: LEFT NOSE
LOCATION SIMPLE: GLABELLA
LOCATION SIMPLE: RIGHT FOREHEAD
LOCATION SIMPLE: SUPERIOR FOREHEAD
LOCATION SIMPLE: LEFT FOREHEAD

## 2021-11-04 ASSESSMENT — LOCATION ZONE DERM
LOCATION ZONE: FACE
LOCATION ZONE: NOSE
LOCATION ZONE: LEG

## 2021-11-04 NOTE — PROCEDURE: BIOPSY BY SHAVE METHOD
Detail Level: Detailed
Depth Of Biopsy: dermis
Was A Bandage Applied: Yes
Size Of Lesion In Cm: 0.3
X Size Of Lesion In Cm: 0
Biopsy Type: H and E
Biopsy Method: Personna blade
Anesthesia Type: 1% lidocaine with epinephrine
Anesthesia Volume In Cc (Will Not Render If 0): 0.5
Hemostasis: Aluminum Chloride
Wound Care: Petrolatum
Dressing: bandage
Type Of Destruction Used: Curettage
Curettage Text: The wound bed was treated with curettage after the biopsy was performed.
Cryotherapy Text: The wound bed was treated with cryotherapy after the biopsy was performed.
Electrodesiccation Text: The wound bed was treated with electrodesiccation after the biopsy was performed.
Electrodesiccation And Curettage Text: The wound bed was treated with electrodesiccation and curettage after the biopsy was performed.
Silver Nitrate Text: The wound bed was treated with silver nitrate after the biopsy was performed.
Lab: 6
Lab Facility: [id number]
Render Path Notes In Note?: No
Consent: Written consent was obtained and risks were reviewed including but not limited to scarring, infection, bleeding, scabbing, incomplete removal, nerve damage and allergy to anesthesia.
Post-Care Instructions: I reviewed with the patient in detail post-care instructions. Keep bandage dry overnight. Thereafter, keep the biopsy site clean with soap and water and then apply Vaseline or Aquaphor once daily with bandage until healed.
Notification Instructions: Patient will be notified of biopsy results. However, patient instructed to call the office if not contacted within 2 weeks.
Billing Type: Third-Party Bill
Information: Selecting Yes will display possible errors in your note based on the variables you have selected. This validation is only offered as a suggestion for you. PLEASE NOTE THAT THE VALIDATION TEXT WILL BE REMOVED WHEN YOU FINALIZE YOUR NOTE. IF YOU WANT TO FAX A PRELIMINARY NOTE YOU WILL NEED TO TOGGLE THIS TO 'NO' IF YOU DO NOT WANT IT IN YOUR FAXED NOTE.

## 2022-03-25 ENCOUNTER — APPOINTMENT (RX ONLY)
Dept: URBAN - METROPOLITAN AREA CLINIC 74 | Facility: CLINIC | Age: 47
Setting detail: DERMATOLOGY
End: 2022-03-25

## 2022-03-25 DIAGNOSIS — Z41.9 ENCOUNTER FOR PROCEDURE FOR PURPOSES OTHER THAN REMEDYING HEALTH STATE, UNSPECIFIED: ICD-10-CM

## 2022-03-25 DIAGNOSIS — I78.8 OTHER DISEASES OF CAPILLARIES: ICD-10-CM

## 2022-03-25 DIAGNOSIS — L82.1 OTHER SEBORRHEIC KERATOSIS: ICD-10-CM

## 2022-03-25 PROCEDURE — ? COUNSELING

## 2022-03-25 PROCEDURE — ? FULL BODY SKIN EXAM - DECLINED

## 2022-03-25 PROCEDURE — ? BOTOX (U OR CC)

## 2022-03-25 PROCEDURE — 99213 OFFICE O/P EST LOW 20 MIN: CPT

## 2022-03-25 PROCEDURE — ? TREATMENT REGIMEN

## 2022-03-25 ASSESSMENT — LOCATION DETAILED DESCRIPTION DERM
LOCATION DETAILED: LEFT INFERIOR CENTRAL MALAR CHEEK
LOCATION DETAILED: RIGHT FOREHEAD
LOCATION DETAILED: LEFT FOREHEAD
LOCATION DETAILED: RIGHT INFERIOR FOREHEAD
LOCATION DETAILED: RIGHT SUPERIOR FOREHEAD
LOCATION DETAILED: LEFT INFERIOR FOREHEAD
LOCATION DETAILED: LEFT LATERAL FOREHEAD
LOCATION DETAILED: LEFT INFERIOR MEDIAL FOREHEAD
LOCATION DETAILED: SUPERIOR MID FOREHEAD
LOCATION DETAILED: RIGHT LATERAL FOREHEAD
LOCATION DETAILED: RIGHT INFERIOR MEDIAL FOREHEAD
LOCATION DETAILED: GLABELLA
LOCATION DETAILED: LEFT SUPERIOR FOREHEAD

## 2022-03-25 ASSESSMENT — LOCATION SIMPLE DESCRIPTION DERM
LOCATION SIMPLE: SUPERIOR FOREHEAD
LOCATION SIMPLE: LEFT FOREHEAD
LOCATION SIMPLE: RIGHT FOREHEAD
LOCATION SIMPLE: GLABELLA
LOCATION SIMPLE: LEFT CHEEK

## 2022-03-25 ASSESSMENT — LOCATION ZONE DERM: LOCATION ZONE: FACE

## 2022-05-18 ENCOUNTER — APPOINTMENT (RX ONLY)
Dept: URBAN - METROPOLITAN AREA CLINIC 74 | Facility: CLINIC | Age: 47
Setting detail: DERMATOLOGY
End: 2022-05-18

## 2022-05-18 DIAGNOSIS — L82.1 OTHER SEBORRHEIC KERATOSIS: ICD-10-CM

## 2022-05-18 DIAGNOSIS — L21.8 OTHER SEBORRHEIC DERMATITIS: ICD-10-CM

## 2022-05-18 DIAGNOSIS — D22 MELANOCYTIC NEVI: ICD-10-CM

## 2022-05-18 DIAGNOSIS — L57.0 ACTINIC KERATOSIS: ICD-10-CM

## 2022-05-18 PROBLEM — D22.72 MELANOCYTIC NEVI OF LEFT LOWER LIMB, INCLUDING HIP: Status: ACTIVE | Noted: 2022-05-18

## 2022-05-18 PROCEDURE — 17000 DESTRUCT PREMALG LESION: CPT | Mod: 59

## 2022-05-18 PROCEDURE — 17003 DESTRUCT PREMALG LES 2-14: CPT

## 2022-05-18 PROCEDURE — ? FULL BODY SKIN EXAM - DECLINED

## 2022-05-18 PROCEDURE — ? COUNSELING

## 2022-05-18 PROCEDURE — 99213 OFFICE O/P EST LOW 20 MIN: CPT | Mod: 25

## 2022-05-18 PROCEDURE — 11300 SHAVE SKIN LESION 0.5 CM/<: CPT

## 2022-05-18 PROCEDURE — ? LIQUID NITROGEN

## 2022-05-18 PROCEDURE — ? SHAVE REMOVAL (NO PATHOLOGY)

## 2022-05-18 PROCEDURE — ? TREATMENT REGIMEN

## 2022-05-18 ASSESSMENT — LOCATION DETAILED DESCRIPTION DERM
LOCATION DETAILED: LEFT MEDIAL SUPERIOR CHEST
LOCATION DETAILED: LEFT PROXIMAL POSTERIOR UPPER ARM
LOCATION DETAILED: LEFT PROXIMAL DORSAL FOREARM
LOCATION DETAILED: RIGHT LATERAL SUPERIOR CHEST
LOCATION DETAILED: RIGHT INFERIOR MEDIAL FOREHEAD
LOCATION DETAILED: RIGHT PROXIMAL RADIAL DORSAL FOREARM
LOCATION DETAILED: LEFT ANTERIOR MEDIAL PROXIMAL THIGH
LOCATION DETAILED: LEFT ANTERIOR PROXIMAL UPPER ARM
LOCATION DETAILED: RIGHT MEDIAL SUPERIOR CHEST
LOCATION DETAILED: LEFT NASAL ALA

## 2022-05-18 ASSESSMENT — LOCATION ZONE DERM
LOCATION ZONE: LEG
LOCATION ZONE: TRUNK
LOCATION ZONE: ARM
LOCATION ZONE: FACE
LOCATION ZONE: NOSE

## 2022-05-18 ASSESSMENT — LOCATION SIMPLE DESCRIPTION DERM
LOCATION SIMPLE: LEFT NOSE
LOCATION SIMPLE: RIGHT FOREHEAD
LOCATION SIMPLE: LEFT THIGH
LOCATION SIMPLE: LEFT POSTERIOR UPPER ARM
LOCATION SIMPLE: LEFT FOREARM
LOCATION SIMPLE: LEFT UPPER ARM
LOCATION SIMPLE: RIGHT FOREARM
LOCATION SIMPLE: CHEST

## 2022-05-18 NOTE — PROCEDURE: LIQUID NITROGEN
Duration Of Freeze Thaw-Cycle (Seconds): 5
Show Applicator Variable?: Yes
Consent: The patient's consent was obtained including but not limited to risks of crusting, scabbing, blistering, scarring, darker or lighter pigmentary change, recurrence, incomplete removal and infection.
Render Note In Bullet Format When Appropriate: No
Detail Level: Detailed
Number Of Freeze-Thaw Cycles: 2 freeze-thaw cycles
Post-Care Instructions: I reviewed with the patient in detail post-care instructions. Patient is to wear sunprotection, and avoid picking at any of the treated lesions. Pt may apply Vaseline or Aquaphor to crusted or scabbing areas.

## 2022-05-18 NOTE — PROCEDURE: SHAVE REMOVAL (NO PATHOLOGY)
Medical Necessity Information: It is in your best interest to select a reason for this procedure from the list below. All of these items fulfill various CMS LCD requirements except the new and changing color options.
Include Z78.9 (Other Specified Conditions Influencing Health Status) As An Associated Diagnosis?: No
Medical Necessity Clause: This procedure was medically necessary because the lesion that was treated was:
Detail Level: Detailed
Size Of Lesion In Cm: 0.3
X Size Of Lesion In Cm (Optional): 0
Size Of Margin In Cm: 0.1
Anesthesia Type: 0.5% lidocaine without epinephrine
Hemostasis: Electrocautery
Wound Care: Petrolatum
Consent was obtained from the patient. The risks and benefits to therapy were discussed in detail. Specifically, the risks of infection, scarring, bleeding, prolonged wound healing, incomplete removal, allergy to anesthesia, nerve injury and recurrence were addressed. Prior to the procedure, the treatment site was clearly identified and confirmed by the patient. All components of Universal Protocol/PAUSE Rule completed.
Post-Care Instructions: I reviewed with the patient in detail post-care instructions. Patient is to keep the biopsy site dry overnight, and then apply bacitracin twice daily until healed. Patient may apply hydrogen peroxide soaks to remove any crusting.

## 2022-05-18 NOTE — PROCEDURE: TREATMENT REGIMEN
Initiate Treatment: Start Hydrocortisone 1% twice daily until clearing, then taper to once daily, then taper off. \\nStart Ketoconazole 2% cream twice daily for 14 days
Detail Level: Zone
Plan: Patient notes that she often does nasal Saline and she thinks it is causing irritation to her skin

## 2022-08-22 ENCOUNTER — APPOINTMENT (RX ONLY)
Dept: URBAN - METROPOLITAN AREA CLINIC 74 | Facility: CLINIC | Age: 47
Setting detail: DERMATOLOGY
End: 2022-08-22

## 2022-08-22 DIAGNOSIS — Z41.9 ENCOUNTER FOR PROCEDURE FOR PURPOSES OTHER THAN REMEDYING HEALTH STATE, UNSPECIFIED: ICD-10-CM

## 2022-08-22 DIAGNOSIS — L57.0 ACTINIC KERATOSIS: ICD-10-CM

## 2022-08-22 PROCEDURE — 99212 OFFICE O/P EST SF 10 MIN: CPT | Mod: 25

## 2022-08-22 PROCEDURE — 17000 DESTRUCT PREMALG LESION: CPT

## 2022-08-22 PROCEDURE — ? BOTOX (U OR CC)

## 2022-08-22 PROCEDURE — ? FULL BODY SKIN EXAM - DECLINED

## 2022-08-22 PROCEDURE — ? LIQUID NITROGEN

## 2022-08-22 PROCEDURE — ? PRODUCT LINE (ZO)

## 2022-08-22 ASSESSMENT — LOCATION DETAILED DESCRIPTION DERM
LOCATION DETAILED: RIGHT INFERIOR FOREHEAD
LOCATION DETAILED: RIGHT SUPERIOR FOREHEAD
LOCATION DETAILED: LEFT FOREHEAD
LOCATION DETAILED: SUPERIOR MID FOREHEAD
LOCATION DETAILED: LEFT SUPERIOR FOREHEAD
LOCATION DETAILED: LEFT INFERIOR FOREHEAD
LOCATION DETAILED: GLABELLA
LOCATION DETAILED: LEFT LATERAL FOREHEAD
LOCATION DETAILED: RIGHT FOREHEAD
LOCATION DETAILED: RIGHT INFERIOR MEDIAL FOREHEAD
LOCATION DETAILED: LEFT INFERIOR MEDIAL FOREHEAD
LOCATION DETAILED: LEFT ANTERIOR PROXIMAL UPPER ARM
LOCATION DETAILED: RIGHT LATERAL FOREHEAD

## 2022-08-22 ASSESSMENT — LOCATION SIMPLE DESCRIPTION DERM
LOCATION SIMPLE: LEFT FOREHEAD
LOCATION SIMPLE: SUPERIOR FOREHEAD
LOCATION SIMPLE: GLABELLA
LOCATION SIMPLE: LEFT UPPER ARM
LOCATION SIMPLE: RIGHT FOREHEAD

## 2022-08-22 ASSESSMENT — LOCATION ZONE DERM
LOCATION ZONE: FACE
LOCATION ZONE: ARM

## 2022-08-22 NOTE — PROCEDURE: PRODUCT LINE (ZO)
Product 18 Application Directions: Apply as directed with gentle cleanser, exfoliating polish, complexion renewal pads, daily power defense, pigment control creme, pigment control blending creme
Detail Level: Zone
Product 48 Price (In Dollars - Numeric Only, No Special Characters Or $): 0.00
Product 23 Application Directions: Apply daily
Product 3 Units: 0
Product 34 Price (In Dollars - Numeric Only, No Special Characters Or $): 70
Name Of Product 11: ZO Daily Skincare Kit
Product 29 Price (In Dollars - Numeric Only, No Special Characters Or $): 120
Name Of Product 6: ZO gentle cleanser
Product 31 Application Directions: Apply, lather rinse daily
Name Of Product 42: Zo vitamin C serum
Name Of Product 37: Pigment control + brightening crème
Product 13 Price (In Dollars - Numeric Only, No Special Characters Or $): 213
Product 3 Application Directions: Apply nightly
Name Of Product 16: Complexion Renewal Pads
Name Of Product 24: ZO Exfoliating cleanser
Product 1 Price (In Dollars - Numeric Only, No Special Characters Or $): 148
Name Of Product 19: Growth factor eye serum
Product 8 Application Directions: Apply daily to eyelids
Product 6 Price (In Dollars - Numeric Only, No Special Characters Or $): 45
Name Of Product 32: Remastered bright alive
Product 11 Price (In Dollars - Numeric Only, No Special Characters Or $): 124
Product 16 Application Directions: Apply daily post cleansing
Product 39 Application Directions: Apply every 2 hours as needed
Product 37 Price (In Dollars - Numeric Only, No Special Characters Or $): 66
Product 34 Application Directions: Apply every night as tolerated
Name Of Product 9: ZO Rozatrol serum
Product 29 Application Directions: Apply every night
Product 21 Application Directions: Apply to affected area morning and night
Name Of Product 4: Complexion pads
Product 13 Application Directions: Apply as directed with retinol skin brightened, bright alive skin brightener, wrinkle + texture repair and daily power defense
Product 26 Application Directions: Apply daily, reapply as needed
Assigning Risk Information: Per AMA, level of risk is based upon consequences of the problem(s) addressed at the encounter when appropriately treated. Risk also includes medical decision making related to the need to initiate or forego further testing, treatment and/or hospitalization. Over the counter medication are assigned a risk level of low. Prescription medication management is assigned a risk level of moderate.
Name Of Product 17: Radical night repair
Name Of Product 40: Recovery cream
Product 19 Price (In Dollars - Numeric Only, No Special Characters Or $): 130
Risk Of Complication Category: Low (OTC Medications)
Name Of Product 35: Enzyme Peel
Name Of Product 30: ZO pore refiner
Product 42 Application Directions: Apply daily in am
Name Of Product 14: Skin normalizing kit
Product 9 Price (In Dollars - Numeric Only, No Special Characters Or $): 88.00
Name Of Product 27: Dual action scrub
Product 37 Application Directions: Apply twice a day as tolerated
Product 4 Price (In Dollars - Numeric Only, No Special Characters Or $): 51.00
Name Of Product 2: ZO Exfoliating Polish
Product 40 Price (In Dollars - Numeric Only, No Special Characters Or $): 115
Product 32 Application Directions: Apply twice daiky
Allow Plan To Count Towards E/M Coding: No (this will remove associated impression from E/M calculation)
Product 27 Application Directions: Apply, lather rinse twice weekly
Product 35 Price (In Dollars - Numeric Only, No Special Characters Or $): 72
Product 24 Application Directions: Apply lather rinse daily
Product 19 Application Directions: Apply serum daily
Product 14 Price (In Dollars - Numeric Only, No Special Characters Or $): 220
Name Of Product 38: Renewal cream
Product 11 Application Directions: Apply as directed with Exfoliating cleanser, exfoliating polish, complexion renewal pads, daily power defense
Product 30 Price (In Dollars - Numeric Only, No Special Characters Or $): 60
Name Of Product 7: ZO Power defense
Product 40 Units: 1
Product 2 Price (In Dollars - Numeric Only, No Special Characters Or $): 67
Product 9 Application Directions: Apply qd
Name Of Product 33: Oil control pads
Name Of Product 28: Hydrating cream
Product 4 Application Directions: Apply every other day, increase to daily as tolerated
Name Of Product 25: Wrinkle and texture repair cream
Product 17 Application Directions: Apply nightly as tolerated
Name Of Product 20: Aggressive anti-aging kit
Product 7 Price (In Dollars - Numeric Only, No Special Characters Or $): 150
Product 35 Application Directions: Apply weekly
Product 14 Application Directions: Apply as directed with gentle cleanser, exfoliating polish, oil control pads, daily power defense and rozatrol
Name Of Product 12: Anti-aging Program Kit
Name Of Product 5: ZO Calming Toner
Send Charges To Patient Encounter: Yes
Name Of Product 41: ZO body emulsion
Product 30 Application Directions: Apply daily to affected areas
Product 22 Application Directions: Apply as directed
Product 25 Price (In Dollars - Numeric Only, No Special Characters Or $): 145
Product 20 Price (In Dollars - Numeric Only, No Special Characters Or $): 263
Product 33 Price (In Dollars - Numeric Only, No Special Characters Or $): 62
Name Of Product 36: ZO intense eye cream
Name Of Product 10: Complexion treatment kit
Product 2 Application Directions: Apply, lather rinse nightly
Product 7 Application Directions: Apply morning and night
Name Of Product 18: Pigment Control program + Hydroquinone
Product 5 Price (In Dollars - Numeric Only, No Special Characters Or $): 37.00
Name Of Product 31: Exfoliating polish
Name Of Product 23: ZO Pigment Control plus brightening creme
Name Of Product 15: Refissa
Product 41 Price (In Dollars - Numeric Only, No Special Characters Or $): 90
Product 10 Price (In Dollars - Numeric Only, No Special Characters Or $): 140
Render Product Pricing In Note: No
Product 20 Application Directions: Apply hydrating cleanser, exfoliating polish, complexion renewal pads, daily power defense, radical night repair, renewal creme as directed
Product 33 Application Directions: Apply daily as tolerated
Product 12 Application Directions: Apply as directed Exfoliating cleanser, exfoliating polish, complexion renewal pads, daily power defense, growth factor serum
Name Of Product 3: ZO Pigment control cream + blending
Product 23 Price (In Dollars - Numeric Only, No Special Characters Or $): 131
Name Of Product 8: ZO Eye brightening creme
Product 28 Application Directions: Apply twice daily
Product 18 Price (In Dollars - Numeric Only, No Special Characters Or $): 182
Product 25 Application Directions: Apply every other night, increase as tolerated
Name Of Product 39: ZO light mineral powder
Product 10 Application Directions: Follow kit directions with Exfoliating cleanser, exfoliating polish, complexion renewal pads, sulfur masque
Name Of Product 34: Tretinoin .05% cream
Product 5 Application Directions: Apply with cotton round daily
Name Of Product 1: ZO growth serum
Name Of Product 13: Skin brightening program +texture repair
Name Of Product 29: Retinol skin brightener
Name Of Product 21: Pigment control creme
Product 41 Application Directions: Apply daily to body
Product 36 Application Directions: Apply to eyelids QAM
Name Of Product 26: ZO sunscreen plus primer

## 2022-08-22 NOTE — PROCEDURE: LIQUID NITROGEN
Render Post-Care Instructions In Note?: no
Post-Care Instructions: I reviewed with the patient in detail post-care instructions. Patient is to wear sunprotection, and avoid picking at any of the treated lesions. Pt may apply Vaseline or Aquaphor to crusted or scabbing areas.
Consent: The patient's consent was obtained including but not limited to risks of crusting, scabbing, blistering, scarring, darker or lighter pigmentary change, recurrence, incomplete removal and infection.
Detail Level: Detailed
Show Applicator Variable?: Yes
Duration Of Freeze Thaw-Cycle (Seconds): 5
Number Of Freeze-Thaw Cycles: 2 freeze-thaw cycles

## 2022-11-29 ENCOUNTER — APPOINTMENT (RX ONLY)
Dept: URBAN - METROPOLITAN AREA CLINIC 74 | Facility: CLINIC | Age: 47
Setting detail: DERMATOLOGY
End: 2022-11-29

## 2022-11-29 DIAGNOSIS — Z41.9 ENCOUNTER FOR PROCEDURE FOR PURPOSES OTHER THAN REMEDYING HEALTH STATE, UNSPECIFIED: ICD-10-CM

## 2022-11-29 PROCEDURE — ? ADDITIONAL NOTES

## 2022-11-29 PROCEDURE — ? COSMETIC CONSULTATION - MICRO-NEEDLING

## 2022-11-29 PROCEDURE — ? PRODUCT LINE (ZO)

## 2022-11-29 PROCEDURE — ? BOTOX (U OR CC)

## 2022-11-29 ASSESSMENT — LOCATION ZONE DERM
LOCATION ZONE: NECK
LOCATION ZONE: FACE

## 2022-11-29 ASSESSMENT — LOCATION SIMPLE DESCRIPTION DERM
LOCATION SIMPLE: GLABELLA
LOCATION SIMPLE: SUPERIOR FOREHEAD
LOCATION SIMPLE: RIGHT ANTERIOR NECK
LOCATION SIMPLE: LEFT FOREHEAD
LOCATION SIMPLE: LEFT ANTERIOR NECK
LOCATION SIMPLE: RIGHT FOREHEAD

## 2022-11-29 ASSESSMENT — LOCATION DETAILED DESCRIPTION DERM
LOCATION DETAILED: LEFT FOREHEAD
LOCATION DETAILED: LEFT SUPERIOR FOREHEAD
LOCATION DETAILED: RIGHT LATERAL FOREHEAD
LOCATION DETAILED: RIGHT SUPERIOR FOREHEAD
LOCATION DETAILED: LEFT SUPERIOR ANTERIOR NECK
LOCATION DETAILED: LEFT INFERIOR MEDIAL FOREHEAD
LOCATION DETAILED: LEFT INFERIOR FOREHEAD
LOCATION DETAILED: LEFT LATERAL FOREHEAD
LOCATION DETAILED: RIGHT INFERIOR MEDIAL FOREHEAD
LOCATION DETAILED: RIGHT FOREHEAD
LOCATION DETAILED: GLABELLA
LOCATION DETAILED: SUPERIOR MID FOREHEAD
LOCATION DETAILED: RIGHT INFERIOR FOREHEAD
LOCATION DETAILED: RIGHT SUPERIOR ANTERIOR NECK

## 2022-11-29 NOTE — PROCEDURE: PRODUCT LINE (ZO)
Product 37 Application Directions: Apply twice a day as tolerated
Product 19 Application Directions: Apply serum daily
Product 40 Application Directions: Apply nightly
Product 24 Units: 1
Product 32 Application Directions: Apply twice daiky
Product 7 Application Directions: Apply morning and night
Product 15 Units: 0
Risk Of Complication Category: No MDM
Product 27 Price (In Dollars - Numeric Only, No Special Characters Or $): 0.00
Name Of Product 5: ZO Calming Toner
Product 24 Application Directions: Apply lather rinse daily
Name Of Product 8: ZO Eye brightening creme
Product 5 Price (In Dollars - Numeric Only, No Special Characters Or $): 37.00
Allow Plan To Count Towards E/M Coding: No (this will remove associated impression from E/M calculation)
Product 22 Application Directions: Apply as directed
Product 15 Application Directions: Apply nightly as tolerated
Name Of Product 13: Skin brightening program +texture repair
Product 43 Application Directions: Apply to face every am
Product 2 Price (In Dollars - Numeric Only, No Special Characters Or $): 67
Product 30 Price (In Dollars - Numeric Only, No Special Characters Or $): 60
Product 10 Price (In Dollars - Numeric Only, No Special Characters Or $): 140
Product 16 Application Directions: Apply daily post cleansing
Name Of Product 16: Complexion Renewal Pads
Product 27 Application Directions: Apply, lather rinse twice weekly
Name Of Product 38: Renewal cream
Name Of Product 20: Aggressive anti-aging kit
Name Of Product 41: ZO body emulsion
Name Of Product 33: Oil control pads
Product 35 Price (In Dollars - Numeric Only, No Special Characters Or $): 72
Name Of Product 23: ZO Pigment Control plus brightening creme
Product 2 Application Directions: Apply, lather rinse nightly
Product 10 Application Directions: Follow kit directions with Exfoliating cleanser, exfoliating polish, complexion renewal pads, sulfur masque
Product 20 Price (In Dollars - Numeric Only, No Special Characters Or $): 263
Product 38 Price (In Dollars - Numeric Only, No Special Characters Or $): 115
Product 41 Price (In Dollars - Numeric Only, No Special Characters Or $): 90
Product 30 Application Directions: Apply daily to affected areas
Product 13 Price (In Dollars - Numeric Only, No Special Characters Or $): 284
Name Of Product 25: Wrinkle and texture repair cream
Name Of Product 28: Hydrating cream
Product 35 Application Directions: Apply weekly
Product 25 Price (In Dollars - Numeric Only, No Special Characters Or $): 145
Send Charges To Patient Encounter: Yes
Product 5 Application Directions: Apply with cotton round daily
Name Of Product 17: Radical night repair
Product 8 Price (In Dollars - Numeric Only, No Special Characters Or $): 130
Name Of Product 6: ZO gentle cleanser
Product 23 Price (In Dollars - Numeric Only, No Special Characters Or $): 131
Product 33 Price (In Dollars - Numeric Only, No Special Characters Or $): 62
Product 13 Application Directions: Apply as directed with retinol skin brightened, bright alive skin brightener, wrinkle + texture repair and daily power defense
Name Of Product 11: ZO Daily Skincare Kit
Name Of Product 3: ZO Pigment control cream + blending
Product 3 Price (In Dollars - Numeric Only, No Special Characters Or $): 66
Product 11 Price (In Dollars - Numeric Only, No Special Characters Or $): 124
Name Of Product 18: Pigment Control program + Hydroquinone
Product 38 Application Directions: Apply daily
Product 20 Application Directions: Apply hydrating cleanser, exfoliating polish, complexion renewal pads, daily power defense, radical night repair, renewal creme as directed
Name Of Product 36: ZO intense eye cream
Product 41 Application Directions: Apply daily to body
Product 33 Application Directions: Apply daily as tolerated
Product 18 Price (In Dollars - Numeric Only, No Special Characters Or $): 182
Name Of Product 21: Pigment control creme
Product 6 Price (In Dollars - Numeric Only, No Special Characters Or $): 45
Render Product Pricing In Note: No
Name Of Product 31: Exfoliating polish
Product 25 Application Directions: Apply every other night, increase as tolerated
Name Of Product 14: Skin normalizing kit
Name Of Product 39: ZO light mineral powder
Product 28 Application Directions: Apply twice daily
Product 14 Price (In Dollars - Numeric Only, No Special Characters Or $): 220
Name Of Product 26: ZO sunscreen plus primer
Name Of Product 42: Zo vitamin C serum
Product 8 Application Directions: Apply daily to eyelids
Name Of Product 1: ZO growth factor serum
Name Of Product 34: Tretinoin .05% cream
Product 11 Application Directions: Apply as directed with Exfoliating cleanser, exfoliating polish, complexion renewal pads, daily power defense
Product 18 Application Directions: Apply as directed with gentle cleanser, exfoliating polish, complexion renewal pads, daily power defense, pigment control creme, pigment control blending creme
Product 31 Application Directions: Apply, lather rinse daily
Name Of Product 4: Complexion pads
Name Of Product 12: Anti-aging Program Kit
Product 36 Application Directions: Apply to eyelids QAM
Name Of Product 9: ZO Rozatrol serum
Name Of Product 29: Retinol skin brightener
Product 34 Price (In Dollars - Numeric Only, No Special Characters Or $): 70
Product 9 Price (In Dollars - Numeric Only, No Special Characters Or $): 88.00
Product 42 Application Directions: Apply daily in am
Name Of Product 7: ZO Power defense
Product 29 Price (In Dollars - Numeric Only, No Special Characters Or $): 120
Product 21 Application Directions: Apply to affected area morning and night
Product 14 Application Directions: Apply as directed with gentle cleanser, exfoliating polish, oil control pads, daily power defense and rozatrol
Product 39 Application Directions: Apply every 2 hours as needed
Product 1 Price (In Dollars - Numeric Only, No Special Characters Or $): 148
Name Of Product 15: Refissa
Detail Level: Zone
Name Of Product 37: Pigment control + brightening crème
Product 12 Price (In Dollars - Numeric Only, No Special Characters Or $): 213
Product 4 Price (In Dollars - Numeric Only, No Special Characters Or $): 51.00
Name Of Product 19: Growth factor eye serum
Name Of Product 32: Remastered bright alive
Name Of Product 24: ZO Exfoliating cleanser
Product 1 Application Directions: Apply every night
Product 7 Price (In Dollars - Numeric Only, No Special Characters Or $): 150
Name Of Product 40: Recovery cream
Product 34 Application Directions: Apply every night as tolerated
Product 26 Application Directions: Apply daily, reapply as needed
Name Of Product 43: Illuminating AOX serum
Product 4 Application Directions: Apply every other day, increase to daily as tolerated
Name Of Product 27: Dual action scrub
Name Of Product 35: Enzyme Peel
Product 43 Price (In Dollars - Numeric Only, No Special Characters Or $): 165
Product 9 Application Directions: Apply qd
Name Of Product 10: Complexion treatment kit
Name Of Product 30: ZO pore refiner
Assigning Risk Information: Per AMA, level of risk is based upon consequences of the problem(s) addressed at the encounter when appropriately treated. Risk also includes medical decision making related to the need to initiate or forego further testing, treatment and/or hospitalization. Over the counter medication are assigned a risk level of low. Prescription medication management is assigned a risk level of moderate.
Product 12 Application Directions: Apply as directed Exfoliating cleanser, exfoliating polish, complexion renewal pads, daily power defense, growth factor serum
Name Of Product 2: ZO Exfoliating Polish

## 2022-11-29 NOTE — PROCEDURE: BOTOX (U OR CC)
Please fax patient's labs to   Dr. Gentry Melendrez . The fax # is [phone]  No future appointments. Price Per Unit Or Per Cc In $ (Use Numbers Only, No Special Characters Or $): 13

## 2022-11-29 NOTE — PROCEDURE: ADDITIONAL NOTES
Detail Level: Simple
Render Risk Assessment In Note?: no
Additional Notes: Recommended Revision Nectifirm cream twice daily

## 2023-01-16 ENCOUNTER — APPOINTMENT (RX ONLY)
Dept: URBAN - METROPOLITAN AREA CLINIC 74 | Facility: CLINIC | Age: 48
Setting detail: DERMATOLOGY
End: 2023-01-16

## 2023-01-16 DIAGNOSIS — Z41.9 ENCOUNTER FOR PROCEDURE FOR PURPOSES OTHER THAN REMEDYING HEALTH STATE, UNSPECIFIED: ICD-10-CM

## 2023-01-16 PROCEDURE — ? BOTOX (U OR CC)

## 2023-01-16 ASSESSMENT — LOCATION DETAILED DESCRIPTION DERM
LOCATION DETAILED: RIGHT SUPERIOR FOREHEAD
LOCATION DETAILED: LEFT SUPERIOR FOREHEAD
LOCATION DETAILED: RIGHT SUPERIOR MEDIAL FOREHEAD

## 2023-01-16 ASSESSMENT — LOCATION SIMPLE DESCRIPTION DERM
LOCATION SIMPLE: RIGHT FOREHEAD
LOCATION SIMPLE: LEFT FOREHEAD

## 2023-01-16 ASSESSMENT — LOCATION ZONE DERM: LOCATION ZONE: FACE

## 2023-04-10 ENCOUNTER — APPOINTMENT (RX ONLY)
Dept: URBAN - METROPOLITAN AREA CLINIC 66 | Facility: CLINIC | Age: 48
Setting detail: DERMATOLOGY
End: 2023-04-10

## 2023-04-10 DIAGNOSIS — Z41.9 ENCOUNTER FOR PROCEDURE FOR PURPOSES OTHER THAN REMEDYING HEALTH STATE, UNSPECIFIED: ICD-10-CM

## 2023-04-10 PROCEDURE — ? MICRONEEDLING

## 2023-04-10 ASSESSMENT — LOCATION ZONE DERM
LOCATION ZONE: NECK
LOCATION ZONE: FACE

## 2023-04-10 ASSESSMENT — LOCATION SIMPLE DESCRIPTION DERM
LOCATION SIMPLE: GLABELLA
LOCATION SIMPLE: LEFT ANTERIOR NECK

## 2023-04-10 ASSESSMENT — LOCATION DETAILED DESCRIPTION DERM
LOCATION DETAILED: GLABELLA
LOCATION DETAILED: LEFT SUPERIOR ANTERIOR NECK

## 2023-04-10 NOTE — PROCEDURE: MICRONEEDLING
Post-Care Instructions: After the procedure, take precautions agains sun exposure. Do not wash face or hands on face for 24 hours after the procedure. Only use the kit provided & Angela 3% oxygen cream sample, as instructed. Keep face moist. Apply gentle products next day. After 2-3 days patients can return to their regular skin regimen. Avoid exfoliants for 1 week. Call if needed.

## 2023-04-10 NOTE — PROCEDURE: MICRONEEDLING
Consent: Written consent obtained risks reviewed including but not limited to pain, scarring, infection and incomplete improvement.  Patient understands the procedure is cosmetic in nature and will require out of pocket payment. Patient signed consent, but I forgot to save.

## 2023-05-04 ENCOUNTER — APPOINTMENT (RX ONLY)
Dept: URBAN - METROPOLITAN AREA CLINIC 74 | Facility: CLINIC | Age: 48
Setting detail: DERMATOLOGY
End: 2023-05-04

## 2023-05-04 DIAGNOSIS — D485 NEOPLASM OF UNCERTAIN BEHAVIOR OF SKIN: ICD-10-CM

## 2023-05-04 DIAGNOSIS — L57.8 OTHER SKIN CHANGES DUE TO CHRONIC EXPOSURE TO NONIONIZING RADIATION: ICD-10-CM

## 2023-05-04 DIAGNOSIS — D22 MELANOCYTIC NEVI: ICD-10-CM

## 2023-05-04 DIAGNOSIS — L81.4 OTHER MELANIN HYPERPIGMENTATION: ICD-10-CM

## 2023-05-04 PROBLEM — D22.5 MELANOCYTIC NEVI OF TRUNK: Status: ACTIVE | Noted: 2023-05-04

## 2023-05-04 PROBLEM — D48.5 NEOPLASM OF UNCERTAIN BEHAVIOR OF SKIN: Status: ACTIVE | Noted: 2023-05-04

## 2023-05-04 PROCEDURE — ? COUNSELING

## 2023-05-04 PROCEDURE — ? PRESCRIPTION MEDICATION MANAGEMENT

## 2023-05-04 PROCEDURE — ? PRESCRIPTION

## 2023-05-04 PROCEDURE — 99214 OFFICE O/P EST MOD 30 MIN: CPT | Mod: 25

## 2023-05-04 PROCEDURE — ? BIOPSY BY SHAVE METHOD

## 2023-05-04 PROCEDURE — 11102 TANGNTL BX SKIN SINGLE LES: CPT

## 2023-05-04 PROCEDURE — ? FULL BODY SKIN EXAM - DECLINED

## 2023-05-04 RX ORDER — FLUOROURACIL 5 MG/G
CREAM TOPICAL
Qty: 40 | Refills: 1 | Status: ERX | COMMUNITY
Start: 2023-05-04

## 2023-05-04 RX ADMIN — FLUOROURACIL: 5 CREAM TOPICAL at 00:00

## 2023-05-04 ASSESSMENT — LOCATION DETAILED DESCRIPTION DERM
LOCATION DETAILED: LEFT LATERAL MALAR CHEEK
LOCATION DETAILED: LEFT INFERIOR MEDIAL MIDBACK
LOCATION DETAILED: RIGHT CENTRAL MALAR CHEEK
LOCATION DETAILED: RIGHT INFERIOR LATERAL UPPER BACK
LOCATION DETAILED: SUPERIOR THORACIC SPINE
LOCATION DETAILED: LEFT SUPERIOR LATERAL UPPER BACK
LOCATION DETAILED: MIDDLE STERNUM
LOCATION DETAILED: RIGHT INFERIOR CENTRAL MALAR CHEEK
LOCATION DETAILED: LEFT MID-UPPER BACK
LOCATION DETAILED: LEFT ANTERIOR LATERAL PROXIMAL THIGH

## 2023-05-04 ASSESSMENT — LOCATION SIMPLE DESCRIPTION DERM
LOCATION SIMPLE: UPPER BACK
LOCATION SIMPLE: LEFT CHEEK
LOCATION SIMPLE: LEFT UPPER BACK
LOCATION SIMPLE: RIGHT UPPER BACK
LOCATION SIMPLE: LEFT LOWER BACK
LOCATION SIMPLE: CHEST
LOCATION SIMPLE: LEFT THIGH
LOCATION SIMPLE: RIGHT CHEEK

## 2023-05-04 ASSESSMENT — LOCATION ZONE DERM
LOCATION ZONE: TRUNK
LOCATION ZONE: LEG
LOCATION ZONE: FACE

## 2023-05-04 NOTE — PROCEDURE: PRESCRIPTION MEDICATION MANAGEMENT
Initiate Treatment: Fluorouracil 5% cream apply twice daily x 14 days to lesions on face
Render In Strict Bullet Format?: No
Detail Level: Zone
Continue Regimen: SPF 45 plus daily

## 2023-05-11 ENCOUNTER — APPOINTMENT (RX ONLY)
Dept: URBAN - METROPOLITAN AREA CLINIC 66 | Facility: CLINIC | Age: 48
Setting detail: DERMATOLOGY
End: 2023-05-11

## 2023-05-11 DIAGNOSIS — Z41.9 ENCOUNTER FOR PROCEDURE FOR PURPOSES OTHER THAN REMEDYING HEALTH STATE, UNSPECIFIED: ICD-10-CM

## 2023-05-11 PROCEDURE — ? MICRONEEDLING

## 2023-05-11 ASSESSMENT — LOCATION ZONE DERM: LOCATION ZONE: FACE

## 2023-05-11 ASSESSMENT — LOCATION SIMPLE DESCRIPTION DERM: LOCATION SIMPLE: GLABELLA

## 2023-05-11 ASSESSMENT — LOCATION DETAILED DESCRIPTION DERM: LOCATION DETAILED: GLABELLA

## 2023-05-11 NOTE — PROCEDURE: MICRONEEDLING
Price (Use Numbers Only, No Special Characters Or $): 943 Price (Use Numbers Only, No Special Characters Or $): 368

## 2023-05-11 NOTE — HPI: COSMETIC (MICRONEEDLING)
Have You Had Microneedling Treatments Before?: has had a previous microneedling treatment
When Was Your Last Treatment?: 4-10-23

## 2023-05-30 ENCOUNTER — APPOINTMENT (RX ONLY)
Dept: URBAN - METROPOLITAN AREA CLINIC 74 | Facility: CLINIC | Age: 48
Setting detail: DERMATOLOGY
End: 2023-05-30

## 2023-05-30 DIAGNOSIS — Z41.9 ENCOUNTER FOR PROCEDURE FOR PURPOSES OTHER THAN REMEDYING HEALTH STATE, UNSPECIFIED: ICD-10-CM

## 2023-05-30 DIAGNOSIS — L57.0 ACTINIC KERATOSIS: ICD-10-CM

## 2023-05-30 DIAGNOSIS — L23.2 ALLERGIC CONTACT DERMATITIS DUE TO COSMETICS: ICD-10-CM | Status: RESOLVING

## 2023-05-30 PROCEDURE — ? COUNSELING

## 2023-05-30 PROCEDURE — ? FULL BODY SKIN EXAM - DECLINED

## 2023-05-30 PROCEDURE — 99213 OFFICE O/P EST LOW 20 MIN: CPT

## 2023-05-30 PROCEDURE — ? BOTOX (U OR CC)

## 2023-05-30 PROCEDURE — ? TREATMENT REGIMEN

## 2023-05-30 PROCEDURE — ? OTHER

## 2023-05-30 PROCEDURE — ? PRESCRIPTION MEDICATION MANAGEMENT

## 2023-05-30 ASSESSMENT — LOCATION ZONE DERM
LOCATION ZONE: FACE
LOCATION ZONE: FACE
LOCATION ZONE: TRUNK
LOCATION ZONE: NECK

## 2023-05-30 ASSESSMENT — LOCATION SIMPLE DESCRIPTION DERM
LOCATION SIMPLE: RIGHT ANTERIOR NECK
LOCATION SIMPLE: CHEST
LOCATION SIMPLE: RIGHT CLAVICULAR SKIN
LOCATION SIMPLE: LEFT FOREHEAD
LOCATION SIMPLE: LEFT CHEEK
LOCATION SIMPLE: RIGHT FOREHEAD

## 2023-05-30 ASSESSMENT — LOCATION DETAILED DESCRIPTION DERM
LOCATION DETAILED: RIGHT FOREHEAD
LOCATION DETAILED: LEFT FOREHEAD
LOCATION DETAILED: RIGHT CLAVICULAR NECK
LOCATION DETAILED: RIGHT SUPERIOR MEDIAL FOREHEAD
LOCATION DETAILED: RIGHT MEDIAL FOREHEAD
LOCATION DETAILED: RIGHT LATERAL SUPERIOR CHEST
LOCATION DETAILED: LEFT LATERAL FOREHEAD
LOCATION DETAILED: RIGHT CLAVICULAR SKIN
LOCATION DETAILED: RIGHT SUPERIOR FOREHEAD
LOCATION DETAILED: LEFT SUPERIOR FOREHEAD
LOCATION DETAILED: LEFT CENTRAL MALAR CHEEK

## 2023-05-30 ASSESSMENT — PAIN INTENSITY VAS: HOW INTENSE IS YOUR PAIN 0 BEING NO PAIN, 10 BEING THE MOST SEVERE PAIN POSSIBLE?: NO PAIN

## 2023-05-30 ASSESSMENT — ITCH NUMERIC RATING SCALE: HOW SEVERE IS YOUR ITCHING?: 7

## 2023-05-30 NOTE — PROCEDURE: TREATMENT REGIMEN
Plan: Patient notes it appears to be getting better every day.
Otc Regimen: Hydrocortisone 1% cream apply twice daily up to 14 days until clear.
Detail Level: Zone
Continue Regimen: Hypoallergenic cleanser and moisturizer

## 2023-05-30 NOTE — PROCEDURE: PRESCRIPTION MEDICATION MANAGEMENT
Initiate Treatment: Fluorouracil 5% cream apply twice daily x 14 days to face, 28 days to chest
Render In Strict Bullet Format?: No
Continue Regimen: SPF 45 daily with reapplication
Detail Level: Zone

## 2023-05-30 NOTE — PROCEDURE: OTHER
Detail Level: Zone
Note Text (......Xxx Chief Complaint.): This diagnosis correlates with the
Render Risk Assessment In Note?: no
Other (Free Text): Deferred Glabella today as she has very little movement. Patient agreed with plan.

## 2023-05-30 NOTE — HPI: RASH
What Type Of Note Output Would You Prefer (Optional)?: Bullet Format
How Severe Is Your Rash?: moderate
Is This A New Presentation, Or A Follow-Up?: Rash
Additional History: Patient thinks the products she uses on her face came into contact with her neck/ chest.

## 2023-06-12 ENCOUNTER — APPOINTMENT (RX ONLY)
Dept: URBAN - METROPOLITAN AREA CLINIC 66 | Facility: CLINIC | Age: 48
Setting detail: DERMATOLOGY
End: 2023-06-12

## 2023-06-12 DIAGNOSIS — Z41.9 ENCOUNTER FOR PROCEDURE FOR PURPOSES OTHER THAN REMEDYING HEALTH STATE, UNSPECIFIED: ICD-10-CM

## 2023-06-12 PROCEDURE — ? COSMETIC CONSULTATION: PRODUCTS

## 2023-06-12 ASSESSMENT — LOCATION SIMPLE DESCRIPTION DERM: LOCATION SIMPLE: INFERIOR FOREHEAD

## 2023-06-12 ASSESSMENT — LOCATION ZONE DERM: LOCATION ZONE: FACE

## 2023-06-12 ASSESSMENT — LOCATION DETAILED DESCRIPTION DERM: LOCATION DETAILED: INFERIOR MID FOREHEAD

## 2023-06-15 ENCOUNTER — APPOINTMENT (RX ONLY)
Dept: URBAN - METROPOLITAN AREA CLINIC 74 | Facility: CLINIC | Age: 48
Setting detail: DERMATOLOGY
End: 2023-06-15

## 2023-06-15 DIAGNOSIS — L24.4 IRRITANT CONTACT DERMATITIS DUE TO DRUGS IN CONTACT WITH SKIN: ICD-10-CM

## 2023-06-15 PROCEDURE — ? FULL BODY SKIN EXAM - DECLINED

## 2023-06-15 PROCEDURE — 99213 OFFICE O/P EST LOW 20 MIN: CPT

## 2023-06-15 PROCEDURE — ? COUNSELING

## 2023-06-15 PROCEDURE — ? PRESCRIPTION MEDICATION MANAGEMENT

## 2023-06-15 ASSESSMENT — LOCATION ZONE DERM
LOCATION ZONE: TRUNK
LOCATION ZONE: FACE

## 2023-06-15 ASSESSMENT — LOCATION DETAILED DESCRIPTION DERM
LOCATION DETAILED: RIGHT MEDIAL SUPERIOR CHEST
LOCATION DETAILED: LEFT INFERIOR CENTRAL MALAR CHEEK
LOCATION DETAILED: RIGHT CENTRAL MALAR CHEEK

## 2023-06-15 ASSESSMENT — LOCATION SIMPLE DESCRIPTION DERM
LOCATION SIMPLE: LEFT CHEEK
LOCATION SIMPLE: CHEST
LOCATION SIMPLE: RIGHT CHEEK

## 2023-06-15 NOTE — PROCEDURE: PRESCRIPTION MEDICATION MANAGEMENT
Plan: Patient agreed with plan and notes she is not having much tenderness, concerns because of the erythema.
Detail Level: Zone
Continue Regimen: Finish Fluorouracil cream for the full 14 days to cheeks and right chest. Recommended applying a barrier cream to peripheral areas we are not treating to help heal reaction. \\nAquaphor ointment
Render In Strict Bullet Format?: No

## 2023-07-07 NOTE — PROCEDURE: MIPS QUALITY
No
Quality 431: Preventive Care And Screening: Unhealthy Alcohol Use - Screening: Patient not identified as an unhealthy alcohol user when screened for unhealthy alcohol use using a systematic screening method
Detail Level: Detailed
Quality 226: Preventive Care And Screening: Tobacco Use: Screening And Cessation Intervention: Patient screened for tobacco use and is an ex/non-smoker
Quality 130: Documentation Of Current Medications In The Medical Record: Current Medications Documented

## 2023-09-19 ENCOUNTER — APPOINTMENT (RX ONLY)
Dept: URBAN - METROPOLITAN AREA CLINIC 74 | Facility: CLINIC | Age: 48
Setting detail: DERMATOLOGY
End: 2023-09-19

## 2023-09-19 DIAGNOSIS — Z41.9 ENCOUNTER FOR PROCEDURE FOR PURPOSES OTHER THAN REMEDYING HEALTH STATE, UNSPECIFIED: ICD-10-CM

## 2023-09-19 PROCEDURE — ? BOTOX (U OR CC)

## 2023-09-19 PROCEDURE — ? OTHER

## 2023-09-19 ASSESSMENT — LOCATION DETAILED DESCRIPTION DERM
LOCATION DETAILED: LEFT SUPERIOR FOREHEAD
LOCATION DETAILED: RIGHT SUPERIOR FOREHEAD
LOCATION DETAILED: LEFT FOREHEAD
LOCATION DETAILED: RIGHT SUPERIOR MEDIAL FOREHEAD
LOCATION DETAILED: RIGHT MEDIAL FOREHEAD
LOCATION DETAILED: RIGHT FOREHEAD
LOCATION DETAILED: LEFT LATERAL FOREHEAD

## 2023-09-19 ASSESSMENT — LOCATION SIMPLE DESCRIPTION DERM
LOCATION SIMPLE: LEFT FOREHEAD
LOCATION SIMPLE: RIGHT FOREHEAD

## 2023-09-19 ASSESSMENT — LOCATION ZONE DERM: LOCATION ZONE: FACE

## 2023-11-13 ENCOUNTER — APPOINTMENT (RX ONLY)
Dept: URBAN - METROPOLITAN AREA CLINIC 66 | Facility: CLINIC | Age: 48
Setting detail: DERMATOLOGY
End: 2023-11-13

## 2023-11-13 DIAGNOSIS — Z41.9 ENCOUNTER FOR PROCEDURE FOR PURPOSES OTHER THAN REMEDYING HEALTH STATE, UNSPECIFIED: ICD-10-CM

## 2023-11-13 PROCEDURE — ? COSMETIC CONSULTATION: PRODUCTS

## 2023-11-13 PROCEDURE — ? HYDRAFACIAL

## 2023-11-13 ASSESSMENT — LOCATION SIMPLE DESCRIPTION DERM: LOCATION SIMPLE: GLABELLA

## 2023-11-13 ASSESSMENT — LOCATION DETAILED DESCRIPTION DERM: LOCATION DETAILED: GLABELLA

## 2023-11-13 ASSESSMENT — LOCATION ZONE DERM: LOCATION ZONE: FACE

## 2023-11-13 NOTE — PROCEDURE: HYDRAFACIAL
Number Of Passes Step 5: 0
Solution: Activ-4
Tip: Hydropeel Tip, Clear
Procedure: Boost
Tip Override
Price (Use Numbers Only, No Special Characters Or $): 199
Solution Override
Procedure: Fusion
Procedure: Extend and Protect
Consent: Written consent obtained, risks reviewed including but not limited to crusting, scabbing, blistering, scarring, darker or lighter pigmentary change, bruising, and/or incomplete response.Apply  Skinbetter [place], Interface face & eye cream, Trio & 3% Angela oxygen cream as instructed on post peel instruction sheet.
Treatment Number: 1
Procedure: Exfoliation
Indication: anti-aging
Tip: Wet Capri Abrasion Kit
Tip: Hydropeel Tip, Teal
Post-Care Instructions: I reviewed with the patient in detail post-care instructions. Patient should stay away from the sun and wear sun protection until treated areas are fully healed. Skinbetter Interfuse Eye, Interfuse Face cream, Angela 3% oxygen cream with thorough post instructions.
Location: face

## 2023-11-27 ENCOUNTER — APPOINTMENT (RX ONLY)
Dept: URBAN - METROPOLITAN AREA CLINIC 49 | Facility: CLINIC | Age: 48
Setting detail: DERMATOLOGY
End: 2023-11-27

## 2023-11-27 DIAGNOSIS — Z41.9 ENCOUNTER FOR PROCEDURE FOR PURPOSES OTHER THAN REMEDYING HEALTH STATE, UNSPECIFIED: ICD-10-CM

## 2023-11-27 PROCEDURE — ? BOTOX (U OR CC)

## 2023-11-27 ASSESSMENT — LOCATION DETAILED DESCRIPTION DERM
LOCATION DETAILED: RIGHT INFERIOR MEDIAL FOREHEAD
LOCATION DETAILED: RIGHT LATERAL FOREHEAD
LOCATION DETAILED: RIGHT SUPERIOR FOREHEAD
LOCATION DETAILED: LEFT FOREHEAD
LOCATION DETAILED: GLABELLA
LOCATION DETAILED: LEFT INFERIOR MEDIAL FOREHEAD
LOCATION DETAILED: SUPERIOR MID FOREHEAD
LOCATION DETAILED: LEFT CENTRAL FRONTAL SCALP

## 2023-11-27 ASSESSMENT — LOCATION SIMPLE DESCRIPTION DERM
LOCATION SIMPLE: RIGHT FOREHEAD
LOCATION SIMPLE: SUPERIOR FOREHEAD
LOCATION SIMPLE: LEFT FOREHEAD
LOCATION SIMPLE: GLABELLA
LOCATION SIMPLE: LEFT SCALP

## 2023-11-27 ASSESSMENT — LOCATION ZONE DERM
LOCATION ZONE: SCALP
LOCATION ZONE: FACE

## 2023-11-27 NOTE — PROCEDURE: BOTOX (U OR CC)
Additional Area 3 Units: 0
Glabellar Complex Units: 7.5
Dilution (U/0.1 Cc): 5
Expiration Date (Month Year): 09/2025
Forehead Units/Cc: 10
Document As Units Or Cc?: units
Post-Care Instructions: Patient instructed to not lie down for 4 hours and limit physical activity for 24 hours.
Lot #: [id number]
Detail Level: Detailed
Price (Use Numbers Only, No Special Characters Or $): 227.11
Consent: Written consent obtained. Risks include but not limited to lid/brow ptosis, bruising, swelling, diplopia, temporary effect, incomplete chemical denervation.
Including Pricing Information In The Note: No
Reconstitution Date (Optional): 11/15/2023
Quantity Per Injection Site (Units Or Cc): 2.5 U
Quantity Per Injection Site (Units Or Cc): 1.25 U

## 2024-02-14 ENCOUNTER — APPOINTMENT (RX ONLY)
Dept: URBAN - METROPOLITAN AREA CLINIC 66 | Facility: CLINIC | Age: 49
Setting detail: DERMATOLOGY
End: 2024-02-14

## 2024-02-14 DIAGNOSIS — Z41.9 ENCOUNTER FOR PROCEDURE FOR PURPOSES OTHER THAN REMEDYING HEALTH STATE, UNSPECIFIED: ICD-10-CM

## 2024-02-14 PROCEDURE — ? MICRONEEDLING

## 2024-02-14 ASSESSMENT — LOCATION SIMPLE DESCRIPTION DERM: LOCATION SIMPLE: LEFT FOREHEAD

## 2024-02-14 ASSESSMENT — LOCATION DETAILED DESCRIPTION DERM: LOCATION DETAILED: LEFT INFERIOR MEDIAL FOREHEAD

## 2024-02-14 ASSESSMENT — LOCATION ZONE DERM: LOCATION ZONE: FACE

## 2024-02-14 NOTE — PROCEDURE: MICRONEEDLING
Location #2: face
Price (Use Numbers Only, No Special Characters Or $): 591
Post-Care Instructions: After the procedure, take precautions agains sun exposure. Do not wash face or hands on face for 24 hours after the procedure. Only use the kit provided & Angela 3% oxygen cream sample, as instructed. Keep face moist. Apply gentle products next day. After 2-3 days patients can return to their regular skin regimen. Avoid exfoliants for 1 week. Call if needed.
Topical Anesthesia?: 23% lidocaine, 7% tetracaine
Depth In Mm (Location #4): 0.1
Detail Level: Zone
Depth In Mm (Location #1): 0.25
Treatment Number (Optional): 5
How Many Cc Of Bacteriostatic 0.9% Saline Were Added?: 0
Depth In Mm (Location #2): 0.5
Consent: Written consent obtained risks reviewed including but not limited to pain, scarring, infection and incomplete improvement.  Patient understands the procedure is cosmetic in nature and will require out of pocket payment. Patient signed consent. Applied 3% Angela oxygen, HG Lift & SkinBetter SPF 50 post treatment. Thoroughly reviewed the post care sheet & 24 hour take home kit. And reviewed correct usage amount of the 3% oxygen cream sample sheet with patient (kills 100% of germs for 60 minutes).
Depth In Mm (Location #3): 0.75

## 2024-02-14 NOTE — HPI: COSMETIC (MICRONEEDLING)
[id number]: previous_has_had_a_previous_microneedling_treatment
When Was Your Last Treatment?: 10-24-23

## 2024-03-14 ENCOUNTER — APPOINTMENT (RX ONLY)
Dept: URBAN - METROPOLITAN AREA CLINIC 66 | Facility: CLINIC | Age: 49
Setting detail: DERMATOLOGY
End: 2024-03-14

## 2024-03-14 DIAGNOSIS — Z41.9 ENCOUNTER FOR PROCEDURE FOR PURPOSES OTHER THAN REMEDYING HEALTH STATE, UNSPECIFIED: ICD-10-CM

## 2024-03-14 PROCEDURE — ? MICRONEEDLING

## 2024-03-14 ASSESSMENT — LOCATION ZONE DERM: LOCATION ZONE: FACE

## 2024-03-14 ASSESSMENT — LOCATION DETAILED DESCRIPTION DERM: LOCATION DETAILED: GLABELLA

## 2024-03-14 ASSESSMENT — LOCATION SIMPLE DESCRIPTION DERM: LOCATION SIMPLE: GLABELLA

## 2024-03-14 NOTE — PROCEDURE: MICRONEEDLING
Anticoagulation Summary  As of 2022    INR goal:  2.0-3.0   TTR:  --   INR used for dosin.20 (2022)   Warfarin maintenance plan:  5 mg (5 mg x 1) every day   Weekly warfarin total:  35 mg   Plan last modified:  Lina QUINN Tolliver (2022)   Next INR check:  2022   Target end date:  Indefinite    Indications    PAF (paroxysmal atrial fibrillation) (HCC) [I48.0]  Chronic anticoagulation [Z79.01]             Anticoagulation Episode Summary     INR check location:  Anticoagulation Clinic    Preferred lab:      Send INR reminders to:      Comments:        Anticoagulation Care Providers     Provider Role Specialty Phone number    Lauri العلي M.D. Referring Cardiovascular Disease (Cardiology) [phone]    [facility] Responsible  [phone]          Refer to Anticoagulation Patient Findings for HPI  Patient Findings     Negatives:  Signs/symptoms of thrombosis, Signs/symptoms of bleeding, Laboratory test error suspected, Change in health, Change in alcohol use, Change in activity, Upcoming invasive procedure, Emergency department visit, Upcoming dental procedure, Missed doses, Extra doses, Change in medications, Change in diet/appetite, Hospital admission, Bruising, Other complaints          Spoke with pt.  INR is SUB therapeutic.     Pt verifies warfarin weekly dosing.     Will have pt BOLUS x 2 doses w/ 10 mg today and 7.5 mg tomorrow and then continue current regimen.    Repeat INR in 2 day(s).     Rohith Goodman, PatriciaD, BCACP      
Depth In Mm (Location #3): 0.75
Depth In Mm (Location #5): 0.1
How Many Cc Of Bacteriostatic 0.9% Saline Were Added?: 0
Location #2: face
Post-Care Instructions: After the procedure, take precautions agains sun exposure. Do not wash face or hands on face for 24 hours after the procedure. Only use the kit provided & Angela 3% oxygen cream sample, as instructed. Keep face moist. Apply gentle products next day. After 2-3 days patients can return to their regular skin regimen. Avoid exfoliants for 1 week. Call if needed.
Topical Anesthesia?: 23% lidocaine, 7% tetracaine
Consent: Written consent obtained risks reviewed including but not limited to pain, scarring, infection and incomplete improvement.  Patient understands the procedure is cosmetic in nature and will require out of pocket payment. Patient signed consent. Applied 3% Angela oxygen, HG Lift & SkinBetter SPF 50 post treatment. Thoroughly reviewed the post care sheet & 24 hour take home kit. And reviewed correct usage amount of the 3% oxygen cream sample sheet with patient (kills 100% of germs for 60 minutes).
Treatment Number (Optional): 4
Detail Level: Zone
Price (Use Numbers Only, No Special Characters Or $): 548
Depth In Mm (Location #2): 0.5
Depth In Mm (Location #1): 0.25

## 2024-03-14 NOTE — HPI: COSMETIC (MICRONEEDLING)
[id number]: previous_has_had_a_previous_microneedling_treatment
When Was Your Last Treatment?: 2-24

## 2024-03-19 ENCOUNTER — APPOINTMENT (RX ONLY)
Dept: URBAN - METROPOLITAN AREA CLINIC 74 | Facility: CLINIC | Age: 49
Setting detail: DERMATOLOGY
End: 2024-03-19

## 2024-03-19 DIAGNOSIS — Z41.9 ENCOUNTER FOR PROCEDURE FOR PURPOSES OTHER THAN REMEDYING HEALTH STATE, UNSPECIFIED: ICD-10-CM

## 2024-03-19 DIAGNOSIS — L57.0 ACTINIC KERATOSIS: ICD-10-CM

## 2024-03-19 DIAGNOSIS — L81.4 OTHER MELANIN HYPERPIGMENTATION: ICD-10-CM

## 2024-03-19 DIAGNOSIS — L663 OTHER SPECIFIED DISEASES OF HAIR AND HAIR FOLLICLES: ICD-10-CM

## 2024-03-19 DIAGNOSIS — D22 MELANOCYTIC NEVI: ICD-10-CM

## 2024-03-19 DIAGNOSIS — L738 OTHER SPECIFIED DISEASES OF HAIR AND HAIR FOLLICLES: ICD-10-CM

## 2024-03-19 DIAGNOSIS — L73.9 FOLLICULAR DISORDER, UNSPECIFIED: ICD-10-CM

## 2024-03-19 DIAGNOSIS — L57.8 OTHER SKIN CHANGES DUE TO CHRONIC EXPOSURE TO NONIONIZING RADIATION: ICD-10-CM

## 2024-03-19 PROBLEM — D22.5 MELANOCYTIC NEVI OF TRUNK: Status: ACTIVE | Noted: 2024-03-19

## 2024-03-19 PROBLEM — L02.12 FURUNCLE OF NECK: Status: ACTIVE | Noted: 2024-03-19

## 2024-03-19 PROCEDURE — ? FULL BODY SKIN EXAM

## 2024-03-19 PROCEDURE — 99214 OFFICE O/P EST MOD 30 MIN: CPT | Mod: 25

## 2024-03-19 PROCEDURE — ? TREATMENT REGIMEN

## 2024-03-19 PROCEDURE — ? COUNSELING

## 2024-03-19 PROCEDURE — ? BOTOX (U OR CC)

## 2024-03-19 PROCEDURE — 17000 DESTRUCT PREMALG LESION: CPT

## 2024-03-19 PROCEDURE — ? PRESCRIPTION MEDICATION MANAGEMENT

## 2024-03-19 PROCEDURE — ? LIQUID NITROGEN

## 2024-03-19 PROCEDURE — ? PRESCRIPTION

## 2024-03-19 RX ORDER — CLINDAMYCIN PHOSPHATE 10 MG/G
GEL TOPICAL BID
Qty: 60 | Refills: 2 | Status: ERX | COMMUNITY
Start: 2024-03-19

## 2024-03-19 RX ADMIN — CLINDAMYCIN PHOSPHATE: 10 GEL TOPICAL at 00:00

## 2024-03-19 ASSESSMENT — LOCATION DETAILED DESCRIPTION DERM
LOCATION DETAILED: RIGHT MID-UPPER BACK
LOCATION DETAILED: RIGHT SUPERIOR MEDIAL MIDBACK
LOCATION DETAILED: LEFT ANTERIOR PROXIMAL UPPER ARM
LOCATION DETAILED: RIGHT CENTRAL EYEBROW
LOCATION DETAILED: LEFT INFERIOR MEDIAL FOREHEAD
LOCATION DETAILED: RIGHT MEDIAL EYEBROW
LOCATION DETAILED: LEFT SUPERIOR FOREHEAD
LOCATION DETAILED: RIGHT FOREHEAD
LOCATION DETAILED: LEFT INFERIOR LATERAL NECK
LOCATION DETAILED: LEFT FOREHEAD
LOCATION DETAILED: RIGHT MEDIAL FOREHEAD
LOCATION DETAILED: LEFT POSTERIOR SHOULDER
LOCATION DETAILED: RIGHT SUPERIOR MEDIAL UPPER BACK
LOCATION DETAILED: LEFT MID-UPPER BACK
LOCATION DETAILED: RIGHT SUPERIOR MEDIAL FOREHEAD
LOCATION DETAILED: RIGHT SUPERIOR FOREHEAD
LOCATION DETAILED: RIGHT POSTERIOR SHOULDER
LOCATION DETAILED: RIGHT ANTERIOR SHOULDER
LOCATION DETAILED: LEFT LATERAL SUPERIOR CHEST
LOCATION DETAILED: RIGHT INFERIOR LATERAL NECK
LOCATION DETAILED: LEFT CENTRAL EYEBROW
LOCATION DETAILED: LEFT LATERAL FOREHEAD
LOCATION DETAILED: GLABELLA

## 2024-03-19 ASSESSMENT — LOCATION ZONE DERM
LOCATION ZONE: NECK
LOCATION ZONE: ARM
LOCATION ZONE: FACE
LOCATION ZONE: TRUNK

## 2024-03-19 ASSESSMENT — LOCATION SIMPLE DESCRIPTION DERM
LOCATION SIMPLE: RIGHT UPPER BACK
LOCATION SIMPLE: LEFT EYEBROW
LOCATION SIMPLE: RIGHT SHOULDER
LOCATION SIMPLE: RIGHT LOWER BACK
LOCATION SIMPLE: LEFT UPPER ARM
LOCATION SIMPLE: RIGHT EYEBROW
LOCATION SIMPLE: LEFT UPPER BACK
LOCATION SIMPLE: GLABELLA
LOCATION SIMPLE: POSTERIOR NECK
LOCATION SIMPLE: LEFT SHOULDER
LOCATION SIMPLE: CHEST
LOCATION SIMPLE: LEFT FOREHEAD
LOCATION SIMPLE: RIGHT FOREHEAD

## 2024-03-19 NOTE — PROCEDURE: BOTOX (U OR CC)
Price Per Unit Or Per Cc In $ (Use Numbers Only, No Special Characters Or $): 13
Consent: Written consent obtained. Risks include but not limited to lid/brow ptosis, bruising, swelling, diplopia, temporary effect, incomplete chemical denervation.
Dilution (U/0.1 Cc): 10
Measure In Units Or Cc's?: units
Lot #: [id number]
Post-Care Instructions: Patient instructed to not lie down for 4 hours and limit physical activity for 24 hours. Patient instructed not to travel by airplane for 48 hours.
Detail Level: Detailed
Expiration Date (Month Year): 02/2026
Quantity Per Injection Site (Units Or Cc): 2
Quantity Per Injection Site (Units Or Cc): 1

## 2024-03-19 NOTE — HPI: EVALUATION OF SKIN LESION(S)
What Type Of Note Output Would You Prefer (Optional)?: Standard Output
Hpi Title: Evaluation of Skin Lesions
How Severe Are Your Spot(S)?: mild
Have Your Spot(S) Been Treated In The Past?: has not been treated
Additional History: Patient has a spot on her chest that she is concerned about.

## 2024-03-19 NOTE — PROCEDURE: PRESCRIPTION MEDICATION MANAGEMENT
Render In Strict Bullet Format?: No
Initiate Treatment: Fluorouracil 5% cream apply twice daily to chest x 14 days\\nAquaphor ointment
Detail Level: Zone
Continue Regimen: SPF 45 daily
Initiate Treatment: clindamycin 1 % topical gel BID\\nQuantity: 60.0 g  Days Supply: 30\\nSig: Apply thin coat to affected area  1-2 times daily after cleansing.

## 2024-06-24 ENCOUNTER — APPOINTMENT (RX ONLY)
Dept: URBAN - METROPOLITAN AREA CLINIC 74 | Facility: CLINIC | Age: 49
Setting detail: DERMATOLOGY
End: 2024-06-24

## 2024-06-24 DIAGNOSIS — L57.0 ACTINIC KERATOSIS: ICD-10-CM

## 2024-06-24 PROCEDURE — ? COUNSELING

## 2024-06-24 PROCEDURE — 17000 DESTRUCT PREMALG LESION: CPT

## 2024-06-24 PROCEDURE — ? LIQUID NITROGEN

## 2024-06-24 ASSESSMENT — LOCATION SIMPLE DESCRIPTION DERM: LOCATION SIMPLE: RIGHT FOREARM

## 2024-06-24 ASSESSMENT — LOCATION ZONE DERM: LOCATION ZONE: ARM

## 2024-06-24 ASSESSMENT — LOCATION DETAILED DESCRIPTION DERM: LOCATION DETAILED: RIGHT PROXIMAL DORSAL FOREARM

## 2024-06-24 NOTE — HPI: SKIN LESION
What Type Of Note Output Would You Prefer (Optional)?: Standard Output
How Severe Is Your Skin Lesion?: mild
[id number]: treated_been_treated
Is This A New Presentation, Or A Follow-Up?: Skin Lesion

## 2024-06-24 NOTE — PROCEDURE: LIQUID NITROGEN
Consent: The patient's consent was obtained including but not limited to risks of crusting, scabbing, blistering, scarring, darker or lighter pigmentary change, recurrence, incomplete removal and infection.
Render Post-Care Instructions In Note?: no
Duration Of Freeze Thaw-Cycle (Seconds): 5
Show Aperture Variable?: Yes
Post-Care Instructions: I reviewed with the patient in detail post-care instructions. Patient is to wear sunprotection, and avoid picking at any of the treated lesions. Pt may apply Vaseline or Aquaphor to crusted or scabbing areas.
Number Of Freeze-Thaw Cycles: 2 freeze-thaw cycles
Detail Level: Detailed

## 2024-07-10 ENCOUNTER — APPOINTMENT (RX ONLY)
Dept: URBAN - METROPOLITAN AREA CLINIC 74 | Facility: CLINIC | Age: 49
Setting detail: DERMATOLOGY
End: 2024-07-10

## 2024-07-10 DIAGNOSIS — Z41.9 ENCOUNTER FOR PROCEDURE FOR PURPOSES OTHER THAN REMEDYING HEALTH STATE, UNSPECIFIED: ICD-10-CM

## 2024-07-10 PROCEDURE — ? BOTOX (U OR CC)

## 2024-07-10 ASSESSMENT — LOCATION DETAILED DESCRIPTION DERM
LOCATION DETAILED: LEFT FOREHEAD
LOCATION DETAILED: LEFT SUPERIOR FOREHEAD
LOCATION DETAILED: RIGHT FOREHEAD
LOCATION DETAILED: RIGHT MEDIAL EYEBROW
LOCATION DETAILED: RIGHT SUPERIOR FOREHEAD
LOCATION DETAILED: RIGHT SUPERIOR MEDIAL FOREHEAD
LOCATION DETAILED: RIGHT CENTRAL EYEBROW
LOCATION DETAILED: RIGHT MEDIAL FOREHEAD
LOCATION DETAILED: GLABELLA
LOCATION DETAILED: LEFT CENTRAL EYEBROW
LOCATION DETAILED: LEFT LATERAL FOREHEAD
LOCATION DETAILED: LEFT INFERIOR MEDIAL FOREHEAD

## 2024-07-10 ASSESSMENT — LOCATION SIMPLE DESCRIPTION DERM
LOCATION SIMPLE: LEFT FOREHEAD
LOCATION SIMPLE: RIGHT EYEBROW
LOCATION SIMPLE: GLABELLA
LOCATION SIMPLE: RIGHT FOREHEAD
LOCATION SIMPLE: LEFT EYEBROW

## 2024-07-10 ASSESSMENT — LOCATION ZONE DERM: LOCATION ZONE: FACE

## 2024-07-10 NOTE — PROCEDURE: BOTOX (U OR CC)
Price Per Unit Or Per Cc In $ (Use Numbers Only, No Special Characters Or $): 13
Consent: Written consent obtained. Risks include but not limited to lid/brow ptosis, bruising, swelling, diplopia, temporary effect, incomplete chemical denervation.
Dilution (U/0.1 Cc): 10
Measure In Units Or Cc's?: units
Lot #: [id number]
Post-Care Instructions: Patient instructed to not lie down for 4 hours and limit physical activity for 24 hours. Patient instructed not to travel by airplane for 48 hours.
Detail Level: Detailed
Expiration Date (Month Year): 04/2026
Quantity Per Injection Site (Units Or Cc): 2
Quantity Per Injection Site (Units Or Cc): 1

## 2024-10-29 ENCOUNTER — APPOINTMENT (RX ONLY)
Dept: URBAN - METROPOLITAN AREA CLINIC 74 | Facility: CLINIC | Age: 49
Setting detail: DERMATOLOGY
End: 2024-10-29

## 2024-10-29 DIAGNOSIS — Z41.9 ENCOUNTER FOR PROCEDURE FOR PURPOSES OTHER THAN REMEDYING HEALTH STATE, UNSPECIFIED: ICD-10-CM

## 2024-10-29 PROCEDURE — ? BOTOX (U OR CC)

## 2024-10-29 ASSESSMENT — LOCATION DETAILED DESCRIPTION DERM
LOCATION DETAILED: RIGHT SUPERIOR MEDIAL FOREHEAD
LOCATION DETAILED: RIGHT SUPERIOR FOREHEAD
LOCATION DETAILED: RIGHT FOREHEAD
LOCATION DETAILED: LEFT SUPERIOR FOREHEAD
LOCATION DETAILED: RIGHT MEDIAL FOREHEAD
LOCATION DETAILED: LEFT LATERAL FOREHEAD
LOCATION DETAILED: LEFT FOREHEAD

## 2024-10-29 ASSESSMENT — LOCATION SIMPLE DESCRIPTION DERM
LOCATION SIMPLE: LEFT FOREHEAD
LOCATION SIMPLE: RIGHT FOREHEAD

## 2024-10-29 ASSESSMENT — LOCATION ZONE DERM: LOCATION ZONE: FACE

## 2024-10-29 NOTE — PROCEDURE: BOTOX (U OR CC)
Price Per Unit Or Per Cc In $ (Use Numbers Only, No Special Characters Or $): 13
Consent: Written consent obtained. Risks include but not limited to lid/brow ptosis, bruising, swelling, diplopia, temporary effect, incomplete chemical denervation.
Dilution (U/0.1 Cc): 10
Measure In Units Or Cc's?: units
Lot #: [id number]
Post-Care Instructions: Patient instructed to not lie down for 4 hours and limit physical activity for 24 hours. Patient instructed not to travel by airplane for 48 hours.
Detail Level: Detailed
Expiration Date (Month Year): 10/2026
Quantity Per Injection Site (Units Or Cc): 2
Quantity Per Injection Site (Units Or Cc): 1

## 2024-11-26 ENCOUNTER — APPOINTMENT (RX ONLY)
Dept: URBAN - METROPOLITAN AREA CLINIC 74 | Facility: CLINIC | Age: 49
Setting detail: DERMATOLOGY
End: 2024-11-26

## 2024-11-26 DIAGNOSIS — L98.8 OTHER SPECIFIED DISORDERS OF THE SKIN AND SUBCUTANEOUS TISSUE: ICD-10-CM

## 2024-11-26 PROCEDURE — ? COUNSELING

## 2024-11-26 PROCEDURE — ? BOTOX (U OR CC)

## 2024-11-26 ASSESSMENT — LOCATION SIMPLE DESCRIPTION DERM
LOCATION SIMPLE: LEFT FOREHEAD
LOCATION SIMPLE: FRONTAL SCALP
LOCATION SIMPLE: RIGHT FOREHEAD

## 2024-11-26 ASSESSMENT — LOCATION ZONE DERM
LOCATION ZONE: SCALP
LOCATION ZONE: FACE

## 2024-11-26 ASSESSMENT — LOCATION DETAILED DESCRIPTION DERM
LOCATION DETAILED: RIGHT SUPERIOR FOREHEAD
LOCATION DETAILED: LEFT FOREHEAD
LOCATION DETAILED: LEFT SUPERIOR FOREHEAD
LOCATION DETAILED: RIGHT LATERAL FOREHEAD
LOCATION DETAILED: MEDIAL FRONTAL SCALP

## 2024-11-26 NOTE — PROCEDURE: BOTOX (U OR CC)
Detail Level: Detailed
Expiration Date (Month Year): 11/2026
Consent: Written consent obtained. Risks include but not limited to lid/brow ptosis, bruising, swelling, diplopia, temporary effect, incomplete chemical denervation.
Dilution (U/0.1 Cc): 10.1
Price Per Unit Or Per Cc In $ (Use Numbers Only, No Special Characters Or $): 13
Post-Care Instructions: Patient instructed to not lie down for 4 hours and limit physical activity for 24 hours. Patient instructed not to travel by airplane for 48 hours.
Lot #: [id number]
Measure In Units Or Cc's?: units
Quantity Per Injection Site (Units Or Cc): 2
Quantity Per Injection Site (Units Or Cc): 1

## 2024-12-06 ENCOUNTER — APPOINTMENT (OUTPATIENT)
Dept: URBAN - METROPOLITAN AREA CLINIC 74 | Facility: CLINIC | Age: 49
Setting detail: DERMATOLOGY
End: 2024-12-06

## 2024-12-06 DIAGNOSIS — L85.3 XEROSIS CUTIS: ICD-10-CM

## 2024-12-06 DIAGNOSIS — L82.1 OTHER SEBORRHEIC KERATOSIS: ICD-10-CM

## 2024-12-06 DIAGNOSIS — L44.8 OTHER SPECIFIED PAPULOSQUAMOUS DISORDERS: ICD-10-CM

## 2024-12-06 DIAGNOSIS — L81.4 OTHER MELANIN HYPERPIGMENTATION: ICD-10-CM

## 2024-12-06 DIAGNOSIS — D18.0 HEMANGIOMA: ICD-10-CM

## 2024-12-06 DIAGNOSIS — L91.8 OTHER HYPERTROPHIC DISORDERS OF THE SKIN: ICD-10-CM

## 2024-12-06 DIAGNOSIS — D22 MELANOCYTIC NEVI: ICD-10-CM

## 2024-12-06 PROBLEM — D18.01 HEMANGIOMA OF SKIN AND SUBCUTANEOUS TISSUE: Status: ACTIVE | Noted: 2024-12-06

## 2024-12-06 PROBLEM — D22.5 MELANOCYTIC NEVI OF TRUNK: Status: ACTIVE | Noted: 2024-12-06

## 2024-12-06 PROCEDURE — ? FULL BODY SKIN EXAM

## 2024-12-06 PROCEDURE — ? TREATMENT REGIMEN

## 2024-12-06 PROCEDURE — 99213 OFFICE O/P EST LOW 20 MIN: CPT

## 2024-12-06 PROCEDURE — ? COUNSELING

## 2024-12-06 ASSESSMENT — LOCATION SIMPLE DESCRIPTION DERM
LOCATION SIMPLE: RIGHT BREAST
LOCATION SIMPLE: CHEST
LOCATION SIMPLE: LEFT THIGH
LOCATION SIMPLE: RIGHT PRETIBIAL REGION
LOCATION SIMPLE: RIGHT THIGH
LOCATION SIMPLE: LEFT UPPER BACK
LOCATION SIMPLE: RIGHT LOWER BACK
LOCATION SIMPLE: ABDOMEN
LOCATION SIMPLE: LEFT FOREARM
LOCATION SIMPLE: RIGHT CHEEK
LOCATION SIMPLE: RIGHT FOREARM
LOCATION SIMPLE: RIGHT UPPER BACK
LOCATION SIMPLE: LEFT PRETIBIAL REGION
LOCATION SIMPLE: LEFT CHEEK

## 2024-12-06 ASSESSMENT — LOCATION ZONE DERM
LOCATION ZONE: ARM
LOCATION ZONE: LEG
LOCATION ZONE: TRUNK
LOCATION ZONE: FACE

## 2024-12-06 ASSESSMENT — LOCATION DETAILED DESCRIPTION DERM
LOCATION DETAILED: LEFT SUPERIOR MEDIAL UPPER BACK
LOCATION DETAILED: LEFT RIB CAGE
LOCATION DETAILED: RIGHT SUPERIOR MEDIAL MIDBACK
LOCATION DETAILED: RIGHT MEDIAL BREAST 5-6:00 REGION
LOCATION DETAILED: LEFT PROXIMAL PRETIBIAL REGION
LOCATION DETAILED: PERIUMBILICAL SKIN
LOCATION DETAILED: LEFT MID-UPPER BACK
LOCATION DETAILED: LEFT ANTERIOR DISTAL THIGH
LOCATION DETAILED: LEFT CENTRAL MALAR CHEEK
LOCATION DETAILED: UPPER STERNUM
LOCATION DETAILED: RIGHT ANTERIOR DISTAL THIGH
LOCATION DETAILED: RIGHT LATERAL ABDOMEN
LOCATION DETAILED: LEFT PROXIMAL DORSAL FOREARM
LOCATION DETAILED: RIGHT DISTAL PRETIBIAL REGION
LOCATION DETAILED: RIGHT MID-UPPER BACK
LOCATION DETAILED: RIGHT ANTERIOR PROXIMAL THIGH
LOCATION DETAILED: RIGHT CENTRAL MALAR CHEEK
LOCATION DETAILED: RIGHT PROXIMAL DORSAL FOREARM

## 2024-12-06 NOTE — HPI: EVALUATION OF SKIN LESION(S)
What Type Of Note Output Would You Prefer (Optional)?: Standard Output
Hpi Title: Evaluation of Skin Lesions
How Severe Are Your Spot(S)?: mild
Have Your Spot(S) Been Treated In The Past?: has not been treated
Additional History: Patient has concerns about lesion on right thigh.

## 2025-03-17 ENCOUNTER — APPOINTMENT (OUTPATIENT)
Dept: URBAN - METROPOLITAN AREA CLINIC 74 | Facility: CLINIC | Age: 50
Setting detail: DERMATOLOGY
End: 2025-03-17

## 2025-03-17 DIAGNOSIS — L98.8 OTHER SPECIFIED DISORDERS OF THE SKIN AND SUBCUTANEOUS TISSUE: ICD-10-CM

## 2025-03-17 PROCEDURE — ? BOTOX (U OR CC)

## 2025-03-17 PROCEDURE — ? COUNSELING

## 2025-03-17 ASSESSMENT — LOCATION DETAILED DESCRIPTION DERM
LOCATION DETAILED: LEFT INFERIOR LATERAL FOREHEAD
LOCATION DETAILED: RIGHT CENTRAL EYEBROW
LOCATION DETAILED: RIGHT CENTRAL FRONTAL SCALP
LOCATION DETAILED: RIGHT INFERIOR MEDIAL FOREHEAD
LOCATION DETAILED: LEFT SUPERIOR FOREHEAD
LOCATION DETAILED: LEFT CENTRAL EYEBROW
LOCATION DETAILED: RIGHT MEDIAL FRONTAL SCALP
LOCATION DETAILED: RIGHT INFERIOR LATERAL FOREHEAD
LOCATION DETAILED: RIGHT SUPERIOR MEDIAL FOREHEAD
LOCATION DETAILED: RIGHT FOREHEAD
LOCATION DETAILED: LEFT FOREHEAD
LOCATION DETAILED: GLABELLA
LOCATION DETAILED: RIGHT SUPERIOR FOREHEAD

## 2025-03-17 ASSESSMENT — LOCATION SIMPLE DESCRIPTION DERM
LOCATION SIMPLE: LEFT FOREHEAD
LOCATION SIMPLE: RIGHT FOREHEAD
LOCATION SIMPLE: RIGHT SCALP
LOCATION SIMPLE: GLABELLA
LOCATION SIMPLE: LEFT EYEBROW
LOCATION SIMPLE: RIGHT EYEBROW

## 2025-03-17 ASSESSMENT — LOCATION ZONE DERM
LOCATION ZONE: SCALP
LOCATION ZONE: FACE

## 2025-07-28 ENCOUNTER — APPOINTMENT (OUTPATIENT)
Dept: URBAN - METROPOLITAN AREA CLINIC 74 | Facility: CLINIC | Age: 50
Setting detail: DERMATOLOGY
End: 2025-07-28

## 2025-07-28 DIAGNOSIS — L91.0 HYPERTROPHIC SCAR: ICD-10-CM

## 2025-07-28 DIAGNOSIS — L70.0 ACNE VULGARIS: ICD-10-CM | Status: INADEQUATELY CONTROLLED

## 2025-07-28 PROCEDURE — ? PRODUCT LINE (OFFICE PRODUCTS)

## 2025-07-28 PROCEDURE — ? PRESCRIPTION MEDICATION MANAGEMENT

## 2025-07-28 PROCEDURE — ? COUNSELING

## 2025-07-28 PROCEDURE — ? ADDITIONAL NOTES

## 2025-07-28 PROCEDURE — ? PRESCRIPTION

## 2025-07-28 RX ORDER — CLINDAMYCIN PHOSPHATE 10 MG/ML
LOTION TOPICAL
Qty: 60 | Refills: 3 | Status: ERX | COMMUNITY
Start: 2025-07-28

## 2025-07-28 RX ADMIN — CLINDAMYCIN PHOSPHATE APPLY: 10 LOTION TOPICAL at 00:00

## 2025-07-28 ASSESSMENT — LOCATION SIMPLE DESCRIPTION DERM
LOCATION SIMPLE: LEFT CHEEK
LOCATION SIMPLE: SUBMENTAL CHIN
LOCATION SIMPLE: LEFT ANTERIOR NECK
LOCATION SIMPLE: CHEST
LOCATION SIMPLE: RIGHT CHEEK
LOCATION SIMPLE: CHIN

## 2025-07-28 ASSESSMENT — LOCATION DETAILED DESCRIPTION DERM
LOCATION DETAILED: LEFT MEDIAL SUPERIOR CHEST
LOCATION DETAILED: SUBMENTAL CHIN
LOCATION DETAILED: RIGHT CENTRAL MALAR CHEEK
LOCATION DETAILED: LEFT CHIN
LOCATION DETAILED: LEFT SUPERIOR ANTERIOR NECK
LOCATION DETAILED: LEFT CENTRAL MALAR CHEEK

## 2025-07-28 ASSESSMENT — LOCATION ZONE DERM
LOCATION ZONE: FACE
LOCATION ZONE: NECK
LOCATION ZONE: TRUNK

## 2025-07-28 NOTE — PROCEDURE: PRODUCT LINE (OFFICE PRODUCTS)
Product 4 Application Directions: 3 capsules po daily
Product 52 Units: 0
Product 62 Price (In Dollars - Numeric Only, No Special Characters Or $): 0.00
Name Of Product 23: R essentials sheer mineral sunscreen
Detail Level: Zone
Product 20 Price (In Dollars - Numeric Only, No Special Characters Or $): 39
Product 10 Price (In Dollars - Numeric Only, No Special Characters Or $): 129
Product 20 Units: 1
Name Of Product 18: ISDIN mineral powder
Product 2 Price (In Dollars - Numeric Only, No Special Characters Or $): 35
Product 25 Price (In Dollars - Numeric Only, No Special Characters Or $): 97.98
Product 7 Price (In Dollars - Numeric Only, No Special Characters Or $): 154
Name Of Product 13: ISDIN URADIN LOTION
Name Of Product 5: ISDIN ERYFOTONA ACTINICA
Product 15 Application Directions: Apply thin layer daily
Product 20 Application Directions: Apply daily to scar until healed
Product 10 Application Directions: Apply every night as tolerated
Assigning Risk Information: Per AMA, level of risk is based upon consequences of the problem(s) addressed at the encounter when appropriately treated. Risk also includes medical decision making related to the need to initiate or forego further testing, treatment and/or hospitalization. Over the counter medication are assigned a risk level of low. Prescription medication management is assigned a risk level of moderate.
Risk Of Complication Category: Low (OTC Medications)
Product 7 Application Directions: Apply QHS
Product 18 Price (In Dollars - Numeric Only, No Special Characters Or $): 53.25
Product 13 Price (In Dollars - Numeric Only, No Special Characters Or $): 36
Product 5 Price (In Dollars - Numeric Only, No Special Characters Or $): 54
Name Of Product 16: Proscriptix activ fx acne cleanser
Name Of Product 21: R essentials Calm and correct Phyto serum
Name Of Product 11: ISDIN AGE CONTOUR CREAM, AM
Product 2 Application Directions: Apply, rinse daily
Product 25 Application Directions: Apply twice daily to upper and lower eyelids
Name Of Product 8: Xtresse gummies for women
Allow Plan To Count Towards E/M Coding: Yes
Product 23 Application Directions: Apply to sun exposed areas every morning with reapplication
Product 13 Application Directions: Apply daily to dry skin.
Product 16 Price (In Dollars - Numeric Only, No Special Characters Or $): 12
Product 11 Price (In Dollars - Numeric Only, No Special Characters Or $): 93
Product 21 Price (In Dollars - Numeric Only, No Special Characters Or $): 70.46
Render Product Pricing In Note: No
Name Of Product 3: Proscriptix Densi FX shampoo
Product 8 Application Directions: Take 2 gummies daily
Product 18 Application Directions: Apply daily with reapplication
Name Of Product 19: R essentials exfoliating brightening polish
Product 8 Price (In Dollars - Numeric Only, No Special Characters Or $): 78
Name Of Product 14: Stratamed ointment
Product 5 Application Directions: Apply daily
Name Of Product 24: Stratamed .17 oz
Product 21 Application Directions: Apply 2-3 drops to skin before moisturizer twice daily
Name Of Product 9: ISDIN KOX eye serum
Name Of Product 1: TOPSEC hair serum
Product 19 Price (In Dollars - Numeric Only, No Special Characters Or $): 59
Product 16 Application Directions: Apply, lather rinse daily to twice daily
Name Of Product 6: ISDIN AGELESS TINTED
Product 24 Price (In Dollars - Numeric Only, No Special Characters Or $): 39.96
Product 11 Application Directions: Apply every am
Product 3 Application Directions: Apply, lather, rinse
Product 6 Price (In Dollars - Numeric Only, No Special Characters Or $): 64
Name Of Product 22: Revision Nectifirm
Name Of Product 12: HARMEET HA concentrated serum
Product 9 Price (In Dollars - Numeric Only, No Special Characters Or $): 76
Product 24 Application Directions: Apply to wound until healed
Name Of Product 17: R essentials tinted SPF
Product 1 Price (In Dollars - Numeric Only, No Special Characters Or $): 60
Product 22 Price (In Dollars - Numeric Only, No Special Characters Or $): 115
Product 12 Price (In Dollars - Numeric Only, No Special Characters Or $): 100
Product 17 Price (In Dollars - Numeric Only, No Special Characters Or $): 55
Name Of Product 4: Proscriptix PRO-pil FX capsules
Product 19 Application Directions: Apply, lather, rinse daily
Product 14 Application Directions: Apply twice daily to wound
Product 6 Application Directions: Apply QAM
Product 1 Application Directions: Apply once to twice daily
Product 4 Price (In Dollars - Numeric Only, No Special Characters Or $): 70
Name Of Product 20: Biocorneum advanced scar treatment
Name Of Product 10: HARMEET age contour cream, night
Name Of Product 15: R essentials Super charged C Serum
Product 17 Application Directions: Apply every am with reapplication
Name Of Product 2: Proscriptix densi Rx Volumizing conditioner
Name Of Product 25: ISDIN vital eyes
Name Of Product 7: HARMEET Bob
Product 22 Application Directions: Apply twice daily to neck, jawline

## 2025-07-28 NOTE — HPI: SCAR
How Severe Is Your Scar?: mild
Is This A New Presentation, Or A Follow-Up?: Scar
Additional History: Pt has a scar from heart surgery and wants to lighten the scar.

## 2025-07-28 NOTE — PROCEDURE: COUNSELING
Detail Level: Detailed
Aklief counseling:  Patient advised to apply a pea-sized amount only at bedtime and wait 30 minutes after washing their face before applying.  If too drying, patient may add a non-comedogenic moisturizer.  The most commonly reported side effects including irritation, redness, scaling, dryness, stinging, burning, itching, and increased risk of sunburn.  The patient verbalized understanding of the proper use and possible adverse effects of retinoids.  All of the patient's questions and concerns were addressed.
Minocycline Pregnancy And Lactation Text: This medication is Pregnancy Category D and not consider safe during pregnancy. It is also excreted in breast milk.
Azithromycin Pregnancy And Lactation Text: This medication is considered safe during pregnancy and is also secreted in breast milk.
Doxycycline Counseling:  Patient counseled regarding possible photosensitivity and increased risk for sunburn.  Patient instructed to avoid sunlight, if possible.  When exposed to sunlight, patients should wear protective clothing, sunglasses, and sunscreen.  The patient was instructed to call the office immediately if the following severe adverse effects occur:  hearing changes, easy bruising/bleeding, severe headache, or vision changes.  The patient verbalized understanding of the proper use and possible adverse effects of doxycycline.  All of the patient's questions and concerns were addressed.
Include Pregnancy/Lactation Warning?: Add Automatically Based on Childbearing Potential and Patient Age
Bactrim Pregnancy And Lactation Text: This medication is Pregnancy Category D and is known to cause fetal risk.  It is also excreted in breast milk.
Erythromycin Counseling:  I discussed with the patient the risks of erythromycin including but not limited to GI upset, allergic reaction, drug rash, diarrhea, increase in liver enzymes, and yeast infections.
Azelaic Acid Counseling: Patient counseled that medicine may cause skin irritation and to avoid applying near the eyes.  In the event of skin irritation, the patient was advised to reduce the amount of the drug applied or use it less frequently.   The patient verbalized understanding of the proper use and possible adverse effects of azelaic acid.  All of the patient's questions and concerns were addressed.
Tazorac Pregnancy And Lactation Text: This medication is not safe during pregnancy. It is unknown if this medication is excreted in breast milk.
Detail Level: Zone
Topical Clindamycin Pregnancy And Lactation Text: This medication is Pregnancy Category B and is considered safe during pregnancy. It is unknown if it is excreted in breast milk.
Isotretinoin Counseling: Patient should get monthly blood tests, not donate blood, not drive at night if vision affected, not share medication, and not undergo elective surgery for 6 months after tx completed. Side effects reviewed, pt to contact office should one occur.
Spironolactone Counseling: Patient advised regarding risks of diarrhea, abdominal pain, hyperkalemia, birth defects (for female patients), liver toxicity and renal toxicity. The patient may need blood work to monitor liver and kidney function and potassium levels while on therapy. The patient verbalized understanding of the proper use and possible adverse effects of spironolactone.  All of the patient's questions and concerns were addressed.
Isotretinoin Pregnancy And Lactation Text: This medication is Pregnancy Category X and is considered extremely dangerous during pregnancy. It is unknown if it is excreted in breast milk.
Benzoyl Peroxide Pregnancy And Lactation Text: This medication is Pregnancy Category C. It is unknown if benzoyl peroxide is excreted in breast milk.
Topical Sulfur Applications Counseling: Topical Sulfur Counseling: Patient counseled that this medication may cause skin irritation or allergic reactions.  In the event of skin irritation, the patient was advised to reduce the amount of the drug applied or use it less frequently.   The patient verbalized understanding of the proper use and possible adverse effects of topical sulfur application.  All of the patient's questions and concerns were addressed.
Birth Control Pills Counseling: Birth Control Pill Counseling: I discussed with the patient the potential side effects of OCPs including but not limited to increased risk of stroke, heart attack, thrombophlebitis, deep venous thrombosis, hepatic adenomas, breast changes, GI upset, headaches, and depression.  The patient verbalized understanding of the proper use and possible adverse effects of OCPs. All of the patient's questions and concerns were addressed.
Dapsone Counseling: I discussed with the patient the risks of dapsone including but not limited to hemolytic anemia, agranulocytosis, rashes, methemoglobinemia, kidney failure, peripheral neuropathy, headaches, GI upset, and liver toxicity.  Patients who start dapsone require monitoring including baseline LFTs and weekly CBCs for the first month, then every month thereafter.  The patient verbalized understanding of the proper use and possible adverse effects of dapsone.  All of the patient's questions and concerns were addressed.
Tetracycline Counseling: Patient counseled regarding possible photosensitivity and increased risk for sunburn.  Patient instructed to avoid sunlight, if possible.  When exposed to sunlight, patients should wear protective clothing, sunglasses, and sunscreen.  The patient was instructed to call the office immediately if the following severe adverse effects occur:  hearing changes, easy bruising/bleeding, severe headache, or vision changes.  The patient verbalized understanding of the proper use and possible adverse effects of tetracycline.  All of the patient's questions and concerns were addressed. Patient understands to avoid pregnancy while on therapy due to potential birth defects.
High Dose Vitamin A Pregnancy And Lactation Text: High dose vitamin A therapy is contraindicated during pregnancy and breast feeding.
Topical Retinoid Pregnancy And Lactation Text: This medication is Pregnancy Category C. It is unknown if this medication is excreted in breast milk.
Winlevi Counseling:  I discussed with the patient the risks of topical clascoterone including but not limited to erythema, scaling, itching, and stinging. Patient voiced their understanding.
Winlevi Pregnancy And Lactation Text: This medication is considered safe during pregnancy and breastfeeding.
Minocycline Counseling: Patient advised regarding possible photosensitivity and discoloration of the teeth, skin, lips, tongue and gums.  Patient instructed to avoid sunlight, if possible.  When exposed to sunlight, patients should wear protective clothing, sunglasses, and sunscreen.  The patient was instructed to call the office immediately if the following severe adverse effects occur:  hearing changes, easy bruising/bleeding, severe headache, or vision changes.  The patient verbalized understanding of the proper use and possible adverse effects of minocycline.  All of the patient's questions and concerns were addressed.
Aklief Pregnancy And Lactation Text: It is unknown if this medication is safe to use during pregnancy.  It is unknown if this medication is excreted in breast milk.  Breastfeeding women should use the topical cream on the smallest area of the skin for the shortest time needed while breastfeeding.  Do not apply to nipple and areola.
Tazorac Counseling:  Patient advised that medication is irritating and drying.  Patient may need to apply sparingly and wash off after an hour before eventually leaving it on overnight.  The patient verbalized understanding of the proper use and possible adverse effects of tazorac.  All of the patient's questions and concerns were addressed.
Azithromycin Counseling:  I discussed with the patient the risks of azithromycin including but not limited to GI upset, allergic reaction, drug rash, diarrhea, and yeast infections.
Doxycycline Pregnancy And Lactation Text: This medication is Pregnancy Category D and not consider safe during pregnancy. It is also excreted in breast milk but is considered safe for shorter treatment courses.
Bactrim Counseling:  I discussed with the patient the risks of sulfa antibiotics including but not limited to GI upset, allergic reaction, drug rash, diarrhea, dizziness, photosensitivity, and yeast infections.  Rarely, more serious reactions can occur including but not limited to aplastic anemia, agranulocytosis, methemoglobinemia, blood dyscrasias, liver or kidney failure, lung infiltrates or desquamative/blistering drug rashes.
Sarecycline Counseling: Patient advised regarding possible photosensitivity and discoloration of the teeth, skin, lips, tongue and gums.  Patient instructed to avoid sunlight, if possible.  When exposed to sunlight, patients should wear protective clothing, sunglasses, and sunscreen.  The patient was instructed to call the office immediately if the following severe adverse effects occur:  hearing changes, easy bruising/bleeding, severe headache, or vision changes.  The patient verbalized understanding of the proper use and possible adverse effects of sarecycline.  All of the patient's questions and concerns were addressed.
Erythromycin Pregnancy And Lactation Text: This medication is Pregnancy Category B and is considered safe during pregnancy. It is also excreted in breast milk.
Azelaic Acid Pregnancy And Lactation Text: This medication is considered safe during pregnancy and breast feeding.
Topical Clindamycin Counseling: Patient counseled that this medication may cause skin irritation or allergic reactions.  In the event of skin irritation, the patient was advised to reduce the amount of the drug applied or use it less frequently.   The patient verbalized understanding of the proper use and possible adverse effects of clindamycin.  All of the patient's questions and concerns were addressed.
Benzoyl Peroxide Counseling: Patient counseled that medicine may cause skin irritation and bleach clothing.  In the event of skin irritation, the patient was advised to reduce the amount of the drug applied or use it less frequently.   The patient verbalized understanding of the proper use and possible adverse effects of benzoyl peroxide.  All of the patient's questions and concerns were addressed.
Spironolactone Pregnancy And Lactation Text: This medication can cause feminization of the male fetus and should be avoided during pregnancy. The active metabolite is also found in breast milk.
Topical Retinoid counseling:  Patient advised to apply a pea-sized amount only at bedtime and wait 30 minutes after washing their face before applying.  If too drying, patient may add a non-comedogenic moisturizer. The patient verbalized understanding of the proper use and possible adverse effects of retinoids.  All of the patient's questions and concerns were addressed.
Birth Control Pills Pregnancy And Lactation Text: This medication should be avoided if pregnant and for the first 30 days post-partum.
Dapsone Pregnancy And Lactation Text: This medication is Pregnancy Category C and is not considered safe during pregnancy or breast feeding.
Topical Sulfur Applications Pregnancy And Lactation Text: This medication is Pregnancy Category C and has an unknown safety profile during pregnancy. It is unknown if this topical medication is excreted in breast milk.
High Dose Vitamin A Counseling: Side effects reviewed, pt to contact office should one occur.
Use Enhanced Medication Counseling?: No

## 2025-07-28 NOTE — HPI: ACNE (PATIENT REPORTED)
Where Is Your Acne Located?: Neck, hairline and chin
Additional Comments (Use Complete Sentences): Pt gets break outs on the neck, chin and hairline. Tried topical clindamycin but made pt red.

## 2025-07-28 NOTE — PROCEDURE: PRESCRIPTION MEDICATION MANAGEMENT
Continue Regimen: Non comodogenic cleanser and moisturizer\\nSPF 45 daily
Detail Level: Zone
Initiate Treatment: clindamycin 1 % lotion \\nQuantity: 60.0 ml  Days Supply: 30\\nSig: Apply to AA QD-BID.
Render In Strict Bullet Format?: No

## 2025-07-28 NOTE — PROCEDURE: ADDITIONAL NOTES
Additional Notes: Recommended Biocorneum advanced scar treatment.
Render Risk Assessment In Note?: no
Detail Level: Simple